# Patient Record
Sex: MALE | Race: WHITE | NOT HISPANIC OR LATINO | Employment: OTHER | URBAN - METROPOLITAN AREA
[De-identification: names, ages, dates, MRNs, and addresses within clinical notes are randomized per-mention and may not be internally consistent; named-entity substitution may affect disease eponyms.]

---

## 2023-08-10 ENCOUNTER — TELEPHONE (OUTPATIENT)
Dept: PRIMARY CARE | Facility: CLINIC | Age: 73
End: 2023-08-10
Payer: MEDICARE

## 2023-08-10 NOTE — TELEPHONE ENCOUNTER
Patient is having total shoulder replacement surgery  on the 24th and is to be released the 25th but he has a npv on the 25th and he wanted to reschedule. I told him the next available appointment is oct but hell be in [place] for the rest of the winter and was hoping to meet dr. Lawton before then to continue his meds. I informed him dr. Lawton only takes one npv a day but he wanted to ask the dr if he would make an exception because he's come highly recommended by his sister and granddaughter. Please advise

## 2023-08-18 PROBLEM — R05.9 COUGH: Status: ACTIVE | Noted: 2023-08-18

## 2023-08-18 RX ORDER — HYDROCHLOROTHIAZIDE 25 MG/1
TABLET ORAL
COMMUNITY
Start: 2023-08-04 | End: 2024-03-04 | Stop reason: SDUPTHER

## 2023-08-18 RX ORDER — FUROSEMIDE 20 MG/1
TABLET ORAL
COMMUNITY
Start: 2023-04-17 | End: 2023-08-22 | Stop reason: ALTCHOICE

## 2023-08-18 RX ORDER — NAPROXEN SODIUM 220 MG/1
1 TABLET, FILM COATED ORAL DAILY
COMMUNITY
End: 2023-08-22 | Stop reason: ALTCHOICE

## 2023-08-18 RX ORDER — AMOXICILLIN 500 MG/1
CAPSULE ORAL
COMMUNITY
Start: 2023-05-31 | End: 2023-08-22 | Stop reason: ALTCHOICE

## 2023-08-18 RX ORDER — TADALAFIL 2.5 MG/1
2.5 TABLET ORAL DAILY
COMMUNITY
Start: 2022-09-20 | End: 2023-08-22 | Stop reason: ALTCHOICE

## 2023-08-18 RX ORDER — LORATADINE 10 MG/1
1 TABLET ORAL DAILY
COMMUNITY
End: 2023-08-22 | Stop reason: ALTCHOICE

## 2023-08-18 RX ORDER — ALBUTEROL SULFATE 90 UG/1
AEROSOL, METERED RESPIRATORY (INHALATION)
COMMUNITY
Start: 2022-08-24

## 2023-08-18 RX ORDER — TAMSULOSIN HYDROCHLORIDE 0.4 MG/1
0.4 CAPSULE ORAL
COMMUNITY
Start: 2023-05-22 | End: 2023-08-30 | Stop reason: SDUPTHER

## 2023-08-18 RX ORDER — MULTIVITAMIN/IRON/FOLIC ACID 18MG-0.4MG
TABLET ORAL
COMMUNITY

## 2023-08-18 RX ORDER — LOSARTAN POTASSIUM AND HYDROCHLOROTHIAZIDE 25; 100 MG/1; MG/1
1 TABLET ORAL DAILY
COMMUNITY
End: 2024-05-21 | Stop reason: WASHOUT

## 2023-08-18 RX ORDER — AMLODIPINE BESYLATE 5 MG/1
TABLET ORAL
COMMUNITY
End: 2024-03-04 | Stop reason: SDUPTHER

## 2023-08-18 RX ORDER — LOSARTAN POTASSIUM AND HYDROCHLOROTHIAZIDE 12.5; 1 MG/1; MG/1
TABLET ORAL
COMMUNITY
End: 2023-08-22 | Stop reason: ALTCHOICE

## 2023-08-18 RX ORDER — CEPHALEXIN 500 MG/1
CAPSULE ORAL
COMMUNITY
Start: 2023-04-17 | End: 2023-08-22 | Stop reason: ALTCHOICE

## 2023-08-18 RX ORDER — CYCLOBENZAPRINE HCL 10 MG
10 TABLET ORAL EVERY 12 HOURS PRN
COMMUNITY
Start: 2023-06-22 | End: 2024-05-21 | Stop reason: SDUPTHER

## 2023-08-18 RX ORDER — AMLODIPINE BESYLATE 2.5 MG/1
1 TABLET ORAL DAILY
COMMUNITY
End: 2023-08-22 | Stop reason: DRUGHIGH

## 2023-08-18 RX ORDER — SYRING-NEEDL,DISP,INSUL,0.3 ML 29 G X1/2"
SYRINGE, EMPTY DISPOSABLE MISCELLANEOUS
COMMUNITY
Start: 2022-10-07 | End: 2023-08-22 | Stop reason: ALTCHOICE

## 2023-08-18 RX ORDER — MUPIROCIN 20 MG/G
OINTMENT TOPICAL
COMMUNITY
Start: 2023-08-10 | End: 2023-08-22 | Stop reason: ALTCHOICE

## 2023-08-18 RX ORDER — OXYCODONE HYDROCHLORIDE 5 MG/1
5 TABLET ORAL EVERY 8 HOURS PRN
COMMUNITY
Start: 2023-07-06 | End: 2024-05-21 | Stop reason: SDUPTHER

## 2023-08-18 RX ORDER — TRAMADOL HYDROCHLORIDE 50 MG/1
50 TABLET ORAL EVERY 6 HOURS PRN
COMMUNITY
Start: 2023-06-22 | End: 2023-08-29 | Stop reason: ALTCHOICE

## 2023-08-21 ENCOUNTER — OFFICE VISIT (OUTPATIENT)
Dept: PRIMARY CARE | Facility: CLINIC | Age: 73
End: 2023-08-21
Payer: MEDICARE

## 2023-08-21 DIAGNOSIS — I10 HYPERTENSION, ESSENTIAL: ICD-10-CM

## 2023-08-21 DIAGNOSIS — C79.51 PROSTATE CANCER METASTATIC TO BONE (MULTI): ICD-10-CM

## 2023-08-21 DIAGNOSIS — K21.9 GASTROESOPHAGEAL REFLUX DISEASE WITHOUT ESOPHAGITIS: ICD-10-CM

## 2023-08-21 DIAGNOSIS — I25.10 CORONARY ARTERY DISEASE INVOLVING NATIVE CORONARY ARTERY OF NATIVE HEART WITHOUT ANGINA PECTORIS: Primary | ICD-10-CM

## 2023-08-21 DIAGNOSIS — C61 PROSTATE CANCER METASTATIC TO BONE (MULTI): ICD-10-CM

## 2023-08-21 DIAGNOSIS — J43.1 PANLOBULAR EMPHYSEMA (MULTI): ICD-10-CM

## 2023-08-21 DIAGNOSIS — M19.012 PRIMARY OSTEOARTHRITIS OF LEFT SHOULDER: ICD-10-CM

## 2023-08-21 PROCEDURE — 99204 OFFICE O/P NEW MOD 45 MIN: CPT | Performed by: FAMILY MEDICINE

## 2023-08-21 PROCEDURE — 3079F DIAST BP 80-89 MM HG: CPT | Performed by: FAMILY MEDICINE

## 2023-08-21 PROCEDURE — 1036F TOBACCO NON-USER: CPT | Performed by: FAMILY MEDICINE

## 2023-08-21 PROCEDURE — 1160F RVW MEDS BY RX/DR IN RCRD: CPT | Performed by: FAMILY MEDICINE

## 2023-08-21 PROCEDURE — 3074F SYST BP LT 130 MM HG: CPT | Performed by: FAMILY MEDICINE

## 2023-08-21 PROCEDURE — 1159F MED LIST DOCD IN RCRD: CPT | Performed by: FAMILY MEDICINE

## 2023-08-22 VITALS
SYSTOLIC BLOOD PRESSURE: 124 MMHG | TEMPERATURE: 97.2 F | HEART RATE: 85 BPM | WEIGHT: 173 LBS | BODY MASS INDEX: 25.62 KG/M2 | DIASTOLIC BLOOD PRESSURE: 82 MMHG | OXYGEN SATURATION: 95 % | HEIGHT: 69 IN

## 2023-08-22 PROBLEM — C61 PROSTATE CANCER METASTATIC TO BONE (MULTI): Status: ACTIVE | Noted: 2022-05-23

## 2023-08-22 PROBLEM — N40.1 BENIGN PROSTATIC HYPERPLASIA WITH URINARY HESITANCY: Status: ACTIVE | Noted: 2022-03-25

## 2023-08-22 PROBLEM — F17.200 SMOKER: Status: ACTIVE | Noted: 2019-03-25

## 2023-08-22 PROBLEM — I25.10 CAD (CORONARY ARTERY DISEASE): Status: ACTIVE | Noted: 2023-08-10

## 2023-08-22 PROBLEM — R39.11 BENIGN PROSTATIC HYPERPLASIA WITH URINARY HESITANCY: Status: ACTIVE | Noted: 2022-03-25

## 2023-08-22 PROBLEM — C79.51 MALIGNANT NEOPLASM METASTATIC TO BONE (MULTI): Status: ACTIVE | Noted: 2022-03-25

## 2023-08-22 PROBLEM — C79.51 PROSTATE CANCER METASTATIC TO BONE (MULTI): Status: ACTIVE | Noted: 2022-05-23

## 2023-08-22 PROBLEM — R68.89 TOTAL SELF-CARE DEFICIT: Status: ACTIVE | Noted: 2023-08-22

## 2023-08-22 PROBLEM — R91.1 PULMONARY NODULE: Status: ACTIVE | Noted: 2023-08-10

## 2023-08-22 PROBLEM — S22.42XD MULTIPLE CLOSED FRACTURES OF RIBS OF LEFT SIDE WITH ROUTINE HEALING: Status: ACTIVE | Noted: 2019-06-24

## 2023-08-22 PROBLEM — M19.012 PRIMARY OSTEOARTHRITIS OF LEFT SHOULDER: Status: ACTIVE | Noted: 2023-08-07

## 2023-08-22 PROBLEM — J18.9 CAP (COMMUNITY ACQUIRED PNEUMONIA): Status: ACTIVE | Noted: 2022-03-25

## 2023-08-22 PROBLEM — R07.9 CHEST PAIN: Status: ACTIVE | Noted: 2022-03-24

## 2023-08-22 PROBLEM — E78.2 HYPERLIPIDEMIA, MIXED: Status: ACTIVE | Noted: 2022-05-23

## 2023-08-22 PROBLEM — I45.2 RBBB (RIGHT BUNDLE BRANCH BLOCK WITH LEFT ANTERIOR FASCICULAR BLOCK): Status: ACTIVE | Noted: 2022-05-23

## 2023-08-22 PROBLEM — M16.11 PRIMARY OSTEOARTHRITIS OF RIGHT HIP: Status: ACTIVE | Noted: 2019-03-25

## 2023-08-22 PROBLEM — K21.9 GASTROESOPHAGEAL REFLUX DISEASE: Status: ACTIVE | Noted: 2023-08-22

## 2023-08-22 PROBLEM — J44.9 COPD WITH HYPOXIA (MULTI): Status: ACTIVE | Noted: 2019-06-24

## 2023-08-22 RX ORDER — CALCIUM CARBONATE 300MG(750)
400 TABLET,CHEWABLE ORAL DAILY
COMMUNITY

## 2023-08-22 NOTE — PROGRESS NOTES
"Subjective   Patient ID: Johnnie Arceo is a 73 y.o. male who presents to [facility].      Currently getting stage four prostate cancer treatment in [place].     Discuss possible cognitive decline.     HPI  Patient with prostate cancer  Positive metastasis  Getting treatments    Memory issues  Would like to consider further work-up  We will start with MRI brain    GERD stable no red flag symptoms    Hypertension stable  No chest pain or shortness of breath    Coronary disease stable  No angina    Positive COPD  No cough for weeks    We will get shoulder replacement surgery at [facility] for bad shoulder left side  Review of Systems   Constitutional:  Negative for activity change and fatigue.   HENT:  Negative for congestion and sore throat.    Eyes:  Negative for discharge.   Respiratory:  Negative for cough, chest tightness and shortness of breath.    Cardiovascular:  Negative for chest pain and leg swelling.   Gastrointestinal:  Negative for abdominal pain, blood in stool, constipation, diarrhea, nausea and vomiting.   Endocrine: Negative for cold intolerance and heat intolerance.   Genitourinary:  Negative for difficulty urinating and hematuria.   Musculoskeletal:  Negative for arthralgias, back pain, gait problem, myalgias and neck pain.   Allergic/Immunologic: Negative for environmental allergies.   Neurological:  Negative for dizziness, syncope, weakness, numbness and headaches.   Hematological:  Negative for adenopathy. Does not bruise/bleed easily.   Psychiatric/Behavioral:  Negative for dysphoric mood. The patient is not nervous/anxious.    All other systems reviewed and are negative.      Objective   /82   Pulse 85   Temp 36.2 °C (97.2 °F)   Ht 1.753 m (5' 9\")   Wt 78.5 kg (173 lb)   SpO2 95%   BMI 25.55 kg/m²    Physical Exam  Vitals and nursing note reviewed.   Constitutional:       General: He is not in acute distress.     Appearance: Normal appearance.   HENT:      Head: " Normocephalic and atraumatic.      Right Ear: Tympanic membrane, ear canal and external ear normal.      Left Ear: Tympanic membrane, ear canal and external ear normal.      Nose: Nose normal.      Mouth/Throat:      Mouth: Mucous membranes are moist.      Pharynx: Oropharynx is clear. No oropharyngeal exudate or posterior oropharyngeal erythema.   Eyes:      Extraocular Movements: Extraocular movements intact.      Conjunctiva/sclera: Conjunctivae normal.      Pupils: Pupils are equal, round, and reactive to light.   Cardiovascular:      Rate and Rhythm: Normal rate and regular rhythm.      Pulses: Normal pulses.      Heart sounds: Normal heart sounds. No murmur heard.  Pulmonary:      Effort: Pulmonary effort is normal. No respiratory distress.      Breath sounds: Normal breath sounds. No wheezing or rales.   Abdominal:      General: Abdomen is flat. Bowel sounds are normal. There is no distension.      Palpations: Abdomen is soft. There is no mass.      Tenderness: There is no abdominal tenderness.   Musculoskeletal:         General: No swelling or deformity. Normal range of motion.      Cervical back: Normal range of motion and neck supple.      Right lower leg: No edema.      Left lower leg: No edema.   Lymphadenopathy:      Cervical: No cervical adenopathy.   Skin:     General: Skin is warm and dry.      Capillary Refill: Capillary refill takes less than 2 seconds.      Findings: No lesion or rash.   Neurological:      General: No focal deficit present.      Mental Status: He is alert and oriented to person, place, and time.      Cranial Nerves: No cranial nerve deficit.      Motor: No weakness.   Psychiatric:         Mood and Affect: Mood normal.         Behavior: Behavior normal.         Thought Content: Thought content normal.         Judgment: Judgment normal.         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  Stay current with age appropriate health maintenance as instructed.  Appointment here or ER with new or worsening symptoms'  Keep appropriate follow-up visit.  Stay current with proper immunizations   Discussed with patient and significant other  MRI brain  Stressed importance of follow-up visit with specialist  3-month recheck care

## 2023-08-25 ENCOUNTER — APPOINTMENT (OUTPATIENT)
Dept: PRIMARY CARE | Facility: CLINIC | Age: 73
End: 2023-08-25
Payer: MEDICARE

## 2023-08-28 ENCOUNTER — PATIENT OUTREACH (OUTPATIENT)
Dept: PRIMARY CARE | Facility: CLINIC | Age: 73
End: 2023-08-28
Payer: MEDICARE

## 2023-08-28 RX ORDER — POTASSIUM CHLORIDE 20 MEQ/1
40 TABLET, EXTENDED RELEASE ORAL
COMMUNITY
Start: 2023-08-27 | End: 2023-08-29

## 2023-08-28 NOTE — PROGRESS NOTES
Discharge Facility: [facility]  Discharge Diagnosis: COPD  Admission Date: 8/26/23  Discharge Date:  8/26/23    PCP Appointment Date: 8/29/23  Specialist Appointment Date:   - ortho: 9/6/23  Hospital Encounter and Summary: Linked   See discharge assessment below for further details    Medications  Medications reviewed with patient/caregiver?: Yes (New/changes only) (8/28/2023  1:48 PM)  Is the patient having any side effects they believe may be caused by any medication additions or changes?: No (8/28/2023  1:48 PM)  Does the patient have all medications ordered at discharge?: Yes (8/28/2023  1:48 PM)  Care Management Interventions: No intervention needed (8/28/2023  1:48 PM)  Is the patient taking all medications as directed (includes completed medication regime)?: Yes (8/28/2023  1:48 PM)  Care Management Interventions: Provided patient education (8/28/2023  1:48 PM)  Medication Comments: START: potassium 20mEq 2 tab every day x2 days (8/28/2023  1:48 PM)    Appointments  Does the patient have a primary care provider?: Yes (8/28/2023  1:48 PM)  Care Management Interventions: Verified appointment date/time/provider (8/28/2023  1:48 PM)  Care Management Interventions: Advised patient to keep appointment (8/28/2023  1:48 PM)    Self Management  Has home health visited the patient within 72 hours of discharge?: Not applicable (8/28/2023  1:48 PM)  What Durable Medical Equipment (DME) was ordered?: Oyxgen 2L on exertion (8/28/2023  1:48 PM)  Has all Durable Medical Equipment (DME) been delivered?: Yes (8/28/2023  1:48 PM)    Patient Teaching  Does the patient have access to their discharge instructions?: Yes (8/28/2023  1:48 PM)  Care Management Interventions: Reviewed instructions with patient (8/28/2023  1:48 PM)  What is the patient's perception of their health status since discharge?: Improving (8/28/2023  1:48 PM)  Is the patient/caregiver able to teach back the hierarchy of who to call/visit for  symptoms/problems? PCP, Specialist, Home Health nurse, Urgent Care, ED, 911: Yes (8/28/2023  1:48 PM)

## 2023-08-29 ENCOUNTER — OFFICE VISIT (OUTPATIENT)
Dept: PRIMARY CARE | Facility: CLINIC | Age: 73
End: 2023-08-29
Payer: MEDICARE

## 2023-08-29 VITALS
BODY MASS INDEX: 27.22 KG/M2 | WEIGHT: 183.8 LBS | OXYGEN SATURATION: 93 % | SYSTOLIC BLOOD PRESSURE: 110 MMHG | HEIGHT: 69 IN | TEMPERATURE: 97.7 F | HEART RATE: 80 BPM | DIASTOLIC BLOOD PRESSURE: 82 MMHG

## 2023-08-29 DIAGNOSIS — C79.51 PROSTATE CANCER METASTATIC TO BONE (MULTI): ICD-10-CM

## 2023-08-29 DIAGNOSIS — I25.10 CORONARY ARTERY DISEASE INVOLVING NATIVE CORONARY ARTERY OF NATIVE HEART WITHOUT ANGINA PECTORIS: Primary | ICD-10-CM

## 2023-08-29 DIAGNOSIS — E78.2 HYPERLIPIDEMIA, MIXED: ICD-10-CM

## 2023-08-29 DIAGNOSIS — C61 PROSTATE CANCER METASTATIC TO BONE (MULTI): ICD-10-CM

## 2023-08-29 DIAGNOSIS — K21.9 GASTROESOPHAGEAL REFLUX DISEASE WITHOUT ESOPHAGITIS: ICD-10-CM

## 2023-08-29 DIAGNOSIS — I10 HYPERTENSION, ESSENTIAL: ICD-10-CM

## 2023-08-29 DIAGNOSIS — Z72.0 TOBACCO ABUSE: ICD-10-CM

## 2023-08-29 DIAGNOSIS — J43.1 PANLOBULAR EMPHYSEMA (MULTI): ICD-10-CM

## 2023-08-29 DIAGNOSIS — G93.40 ENCEPHALOPATHY: ICD-10-CM

## 2023-08-29 DIAGNOSIS — M19.012 PRIMARY OSTEOARTHRITIS OF LEFT SHOULDER: ICD-10-CM

## 2023-08-29 PROBLEM — Z96.612 S/P REVERSE TOTAL SHOULDER ARTHROPLASTY, LEFT: Status: ACTIVE | Noted: 2023-08-24

## 2023-08-29 PROBLEM — E87.6 HYPOKALEMIA: Status: ACTIVE | Noted: 2023-08-26

## 2023-08-29 PROCEDURE — 1036F TOBACCO NON-USER: CPT | Performed by: FAMILY MEDICINE

## 2023-08-29 PROCEDURE — 3074F SYST BP LT 130 MM HG: CPT | Performed by: FAMILY MEDICINE

## 2023-08-29 PROCEDURE — 3079F DIAST BP 80-89 MM HG: CPT | Performed by: FAMILY MEDICINE

## 2023-08-29 PROCEDURE — 1160F RVW MEDS BY RX/DR IN RCRD: CPT | Performed by: FAMILY MEDICINE

## 2023-08-29 PROCEDURE — 99214 OFFICE O/P EST MOD 30 MIN: CPT | Performed by: FAMILY MEDICINE

## 2023-08-29 PROCEDURE — 1159F MED LIST DOCD IN RCRD: CPT | Performed by: FAMILY MEDICINE

## 2023-08-29 ASSESSMENT — ENCOUNTER SYMPTOMS
HEMATURIA: 0
CONSTIPATION: 0
DYSPHORIC MOOD: 0
NECK PAIN: 0
LOSS OF SENSATION IN FEET: 0
DEPRESSION: 0
WEAKNESS: 0
DIFFICULTY URINATING: 0
ABDOMINAL PAIN: 0
HEADACHES: 0
BRUISES/BLEEDS EASILY: 0
NERVOUS/ANXIOUS: 0
COUGH: 0
ADENOPATHY: 0
ARTHRALGIAS: 0
SHORTNESS OF BREATH: 0
FATIGUE: 0
OCCASIONAL FEELINGS OF UNSTEADINESS: 0
ACTIVITY CHANGE: 0
NAUSEA: 0
BACK PAIN: 0
DIARRHEA: 0
SORE THROAT: 0
EYE DISCHARGE: 0
CHEST TIGHTNESS: 0
DIZZINESS: 0
NUMBNESS: 0
MYALGIAS: 0
VOMITING: 0
BLOOD IN STOOL: 0

## 2023-08-29 ASSESSMENT — PATIENT HEALTH QUESTIONNAIRE - PHQ9
2. FEELING DOWN, DEPRESSED OR HOPELESS: NOT AT ALL
1. LITTLE INTEREST OR PLEASURE IN DOING THINGS: NOT AT ALL
SUM OF ALL RESPONSES TO PHQ9 QUESTIONS 1 AND 2: 0

## 2023-08-29 NOTE — PROGRESS NOTES
"Patient: Johnnie Arceo  : 1950  PCP: Salazar Lawton MD  MRN: [id number]  Program: No linked episodes     Johnnie Arceo is a 73 y.o. male presenting today for follow-up after being discharged from the hospital 3 days ago. The main problem requiring admission was COPD. The discharge summary and/or Transitional Care Management documentation was reviewed. Medication reconciliation was performed as indicated via the \"Brian as Reviewed\" timestamp.     Johnnie Arceo was contacted by Transitional Care Management services two days after his discharge. This encounter and supporting documentation was reviewed.    The complexity of medical decision making for this patient's transitional care is high.    Discharge Facility: [facility]  Discharge Diagnosis: COPD  Admission Date: 23  Discharge Date:  23    PCP Appointment Date: 23  Specialist Appointment Date:   - ortho: 23  Hospital Encounter and Summary: Linked   See discharge assessment below for further details    Medications  Medications reviewed with patient/caregiver?: Yes (New/changes only) (2023  1:48 PM)  Is the patient having any side effects they believe may be caused by any medication additions or changes?: No (2023  1:48 PM)  Does the patient have all medications ordered at discharge?: Yes (2023  1:48 PM)  Care Management Interventions: No intervention needed (2023  1:48 PM)  Is the patient taking all medications as directed (includes completed medication regime)?: Yes (2023  1:48 PM)  Care Management Interventions: Provided patient education (2023  1:48 PM)  Medication Comments: START: potassium 20mEq 2 tab every day x2 days (2023  1:48 PM)    Appointments  Does the patient have a primary care provider?: Yes (2023  1:48 PM)  Care Management Interventions: Verified appointment date/time/provider (2023  1:48 PM)  Care Management Interventions: Advised patient to keep appointment " (8/28/2023  1:48 PM)    Self Management  Has home health visited the patient within 72 hours of discharge?: Not applicable (8/28/2023  1:48 PM)  What Durable Medical Equipment (DME) was ordered?: Oyxgen 2L on exertion (8/28/2023  1:48 PM)  Has all Durable Medical Equipment (DME) been delivered?: Yes (8/28/2023  1:48 PM)    Patient Teaching  Does the patient have access to their discharge instructions?: Yes (8/28/2023  1:48 PM)  Care Management Interventions: Reviewed instructions with patient (8/28/2023  1:48 PM)  What is the patient's perception of their health status since discharge?: Improving (8/28/2023  1:48 PM)  Is the patient/caregiver able to teach back the hierarchy of who to call/visit for symptoms/problems? PCP, Specialist, Home Health nurse, Urgent Care, ED, 911: Yes (8/28/2023  1:48 PM)        Had shoulder surgery and then kept for breathing issues.     HPI  Status post left shoulder replacement  Healing well and will begin therapy    Prostate cancer with metastatic disease to the bone  Widespread  Undergoing treatment for this which seems to be stabilizing  Review recent PSA testing    Memory issues  Patient does not notice it so much but his significant other does  They would like further testing  We will check MRI brain  CAT scan brain 2021 was without metastatic brain disease    COPD ongoing  Patient is a smoker wheeze attempting to quit  Refer to pulmonology  He is using an inhaler  Review of Systems   Constitutional:  Negative for activity change and fatigue.   HENT:  Negative for congestion and sore throat.    Eyes:  Negative for discharge.   Respiratory:  Negative for cough, chest tightness and shortness of breath.    Cardiovascular:  Negative for chest pain and leg swelling.   Gastrointestinal:  Negative for abdominal pain, blood in stool, constipation, diarrhea, nausea and vomiting.   Endocrine: Negative for cold intolerance and heat intolerance.   Genitourinary:  Negative for difficulty  "urinating and hematuria.   Musculoskeletal:  Negative for arthralgias, back pain, gait problem, myalgias and neck pain.   Allergic/Immunologic: Negative for environmental allergies.   Neurological:  Negative for dizziness, syncope, weakness, numbness and headaches.   Hematological:  Negative for adenopathy. Does not bruise/bleed easily.   Psychiatric/Behavioral:  Negative for dysphoric mood. The patient is not nervous/anxious.    All other systems reviewed and are negative.      Objective   /82 (BP Location: Right arm, BP Cuff Size: Large adult)   Pulse 80   Temp 36.5 °C (97.7 °F)   Ht 1.753 m (5' 9\")   Wt 83.4 kg (183 lb 12.8 oz)   SpO2 93%   BMI 27.14 kg/m²    Physical Exam  Vitals and nursing note reviewed.   Constitutional:       General: He is not in acute distress.     Appearance: Normal appearance.   HENT:      Head: Normocephalic and atraumatic.      Right Ear: Tympanic membrane, ear canal and external ear normal.      Left Ear: Tympanic membrane, ear canal and external ear normal.      Nose: Nose normal.      Mouth/Throat:      Mouth: Mucous membranes are moist.      Pharynx: Oropharynx is clear. No oropharyngeal exudate or posterior oropharyngeal erythema.   Eyes:      Extraocular Movements: Extraocular movements intact.      Conjunctiva/sclera: Conjunctivae normal.      Pupils: Pupils are equal, round, and reactive to light.   Cardiovascular:      Rate and Rhythm: Normal rate and regular rhythm.      Pulses: Normal pulses.      Heart sounds: Normal heart sounds. No murmur heard.  Pulmonary:      Effort: Pulmonary effort is normal. No respiratory distress.      Breath sounds: Normal breath sounds. No wheezing or rales.   Abdominal:      General: Abdomen is flat. Bowel sounds are normal. There is no distension.      Palpations: Abdomen is soft. There is no mass.      Tenderness: There is no abdominal tenderness.   Musculoskeletal:         General: No swelling or deformity. Normal range of " motion.      Cervical back: Normal range of motion and neck supple.      Right lower leg: No edema.      Left lower leg: No edema.   Lymphadenopathy:      Cervical: No cervical adenopathy.   Skin:     General: Skin is warm and dry.      Capillary Refill: Capillary refill takes less than 2 seconds.      Findings: No lesion or rash.   Neurological:      General: No focal deficit present.      Mental Status: He is alert and oriented to person, place, and time.      Cranial Nerves: No cranial nerve deficit.      Motor: No weakness.   Psychiatric:         Mood and Affect: Mood normal.         Behavior: Behavior normal.         Thought Content: Thought content normal.         Judgment: Judgment normal.         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  Stay current with age appropriate health maintenance as instructed.  Appointment here or ER with new or worsening symptoms'  Keep appropriate follow-up visit.  Stay current with proper immunizations

## 2023-08-30 DIAGNOSIS — R39.11 BENIGN PROSTATIC HYPERPLASIA WITH URINARY HESITANCY: ICD-10-CM

## 2023-08-30 DIAGNOSIS — N40.1 BENIGN PROSTATIC HYPERPLASIA WITH URINARY HESITANCY: ICD-10-CM

## 2023-08-30 RX ORDER — TAMSULOSIN HYDROCHLORIDE 0.4 MG/1
0.4 CAPSULE ORAL DAILY
Qty: 30 CAPSULE | Refills: 0 | Status: SHIPPED | OUTPATIENT
Start: 2023-08-30

## 2023-08-30 NOTE — TELEPHONE ENCOUNTER
Rx Refill Request Telephone Encounter    Name:  Johnnie Arceo  :  [id number]  Medication Name:  tamsulosin 0.4mg    [facility] [address] [facility]    Date of last appointment:  23  Date of next appointment:  n/a   Best number to reach patient:  [phone]

## 2023-08-30 NOTE — TELEPHONE ENCOUNTER
Please advise pended medication for Dr. Lawton patient.      Medication has been pended to provider for approval.     LV: 8/29/2023   NV:  Visit date not found

## 2023-09-01 ENCOUNTER — PATIENT OUTREACH (OUTPATIENT)
Dept: PRIMARY CARE | Facility: CLINIC | Age: 73
End: 2023-09-01
Payer: MEDICARE

## 2023-09-01 NOTE — PROGRESS NOTES
Unable to reach patient for call back after patient's follow up appointment with PCP.   BENJAMINM with call back number for patient to call if needed   If no voicemail available call attempts x 2 were made to contact the patient to assist with any questions or concerns patient may have.

## 2023-09-05 PROBLEM — G93.40 ENCEPHALOPATHY: Status: ACTIVE | Noted: 2023-09-05

## 2023-09-05 ASSESSMENT — ENCOUNTER SYMPTOMS
FATIGUE: 0
NAUSEA: 0
CHEST TIGHTNESS: 0
HEMATURIA: 0
BRUISES/BLEEDS EASILY: 0
WEAKNESS: 0
DIFFICULTY URINATING: 0
EYE DISCHARGE: 0
VOMITING: 0
ABDOMINAL PAIN: 0
HEADACHES: 0
BLOOD IN STOOL: 0
DYSPHORIC MOOD: 0
SHORTNESS OF BREATH: 0
NERVOUS/ANXIOUS: 0
ARTHRALGIAS: 0
CONSTIPATION: 0
ACTIVITY CHANGE: 0
SORE THROAT: 0
MYALGIAS: 0
BACK PAIN: 0
DIZZINESS: 0
COUGH: 0
ADENOPATHY: 0
NECK PAIN: 0
NUMBNESS: 0
DIARRHEA: 0

## 2023-09-14 DIAGNOSIS — R90.89 ABNORMAL BRAIN MRI: ICD-10-CM

## 2023-10-05 ENCOUNTER — OFFICE VISIT (OUTPATIENT)
Dept: NEUROSURGERY | Facility: HOSPITAL | Age: 73
End: 2023-10-05
Payer: MEDICARE

## 2023-10-05 VITALS
DIASTOLIC BLOOD PRESSURE: 70 MMHG | SYSTOLIC BLOOD PRESSURE: 129 MMHG | HEIGHT: 70 IN | BODY MASS INDEX: 25.77 KG/M2 | HEART RATE: 71 BPM | WEIGHT: 180 LBS | RESPIRATION RATE: 18 BRPM

## 2023-10-05 DIAGNOSIS — I65.21 OCCLUSION OF RIGHT CAROTID ARTERY: Primary | ICD-10-CM

## 2023-10-05 PROCEDURE — 1036F TOBACCO NON-USER: CPT | Performed by: NEUROLOGICAL SURGERY

## 2023-10-05 PROCEDURE — 3078F DIAST BP <80 MM HG: CPT | Performed by: NEUROLOGICAL SURGERY

## 2023-10-05 PROCEDURE — 3074F SYST BP LT 130 MM HG: CPT | Performed by: NEUROLOGICAL SURGERY

## 2023-10-05 PROCEDURE — 1125F AMNT PAIN NOTED PAIN PRSNT: CPT | Performed by: NEUROLOGICAL SURGERY

## 2023-10-05 PROCEDURE — 99443 PR PHYS/QHP TELEPHONE EVALUATION 21-30 MIN: CPT | Performed by: NEUROLOGICAL SURGERY

## 2023-10-05 PROCEDURE — 1159F MED LIST DOCD IN RCRD: CPT | Performed by: NEUROLOGICAL SURGERY

## 2023-10-05 PROCEDURE — 1160F RVW MEDS BY RX/DR IN RCRD: CPT | Performed by: NEUROLOGICAL SURGERY

## 2023-10-05 ASSESSMENT — PAIN SCALES - GENERAL: PAINLEVEL: 8

## 2023-10-05 NOTE — PROGRESS NOTES
NPV- MRI noted Loss of Flow Void within the Cavernous and Paraopthalmic Segments of the Right ICA. MRI done 9/13/23. Presented with memory difficulty 5 years ago. PCP ordered MRI for memory difficulty. Short term memory issues.    Johnnie Arceo is a 73 y.o. year old male    HPI  This was a telephone visit.     Mr. Arceo is a 74 yo gentleman, per patient, had a MRI done on 9/13/2023 at the request of his significant other because he was getting older.  He denied any symptoms that prompted this MRI.  Per radiology requisition, the MRI was ordered for memory loss.      He is currently on an 81 mg ASA daily.      Review of Systems       Past Medical History:   Diagnosis Date    Infective polyarthritis (CMS/HCC) 09/27/2011       Past Surgical History:   Procedure Laterality Date    CT GUIDED PERCUTANEOUS BIOPSY BONE DEEP  4/5/2022    CT GUIDED PERCUTANEOUS BIOPSY BONE DEEP 4/5/2022           Current Outpatient Medications:     albuterol 90 mcg/actuation inhaler, INHALE 2 PUFFS AS DIRECTED EVERY 4 HOURS AS NEEDED, Disp: , Rfl:     amLODIPine (Norvasc) 5 mg tablet, Take by mouth., Disp: , Rfl:     apalutamide (Erleada) 60 mg tablet, Take by mouth., Disp: , Rfl:     cyclobenzaprine (Flexeril) 10 mg tablet, Take 1 tablet (10 mg) by mouth every 12 hours if needed., Disp: , Rfl:     hydroCHLOROthiazide (HYDRODiuril) 25 mg tablet, , Disp: , Rfl:     losartan-hydrochlorothiazide (Hyzaar) 100-25 mg tablet, Take 1 tablet by mouth once daily., Disp: , Rfl:     magnesium oxide (Mag-Ox) 400 mg tablet, 1 tablet (400 mg) once daily., Disp: , Rfl:     oxyCODONE (Roxicodone) 5 mg immediate release tablet, Take 1 tablet (5 mg) by mouth every 8 hours if needed., Disp: , Rfl:     tamsulosin (Flomax) 0.4 mg 24 hr capsule, Take 1 capsule (0.4 mg) by mouth once daily., Disp: 30 capsule, Rfl: 0    TURMERIC ORAL, Take by mouth., Disp: , Rfl:     multivitamin-iron-folic acid (Spectravite Advanced Formula)  mg-mcg tablet tablet, Take by  mouth., Disp: , Rfl:         Objective   Vitals:    10/05/23 1148   BP: 129/70   Pulse: 71   Resp: 18       Neurological Exam  Deferred    [unfilled]  MR brain w and wo IV contrast  Status: Final result     PACS Images     Show images for MR brain w and wo IV contrast  Signed by    Signed Time Phone Pager   Jaye Nielsen MD 9/13/2023 15:40 [phone]      Exam Information    Status Exam Begun Exam Ended   Final  9/13/2023 15:16     Study Result    Narrative & Impression   Interpreted By:  JAYE NIELSEN MD  MRN: [id number]  Patient Name: ZENOBIA YEAGER     STUDY:  MRI BRAIN W/WO CONTRAST     INDICATION:  encephalopthy     COMPARISON:  None.     ACCESSION NUMBER(S):  [id number]     ORDERING CLINICIAN:  NOMI CATHERINE     TECHNIQUE:  Multi-planar multi-sequential MR imaging of the brain was performed  before and after the intravenous administration of contrast. 16 ml of  Dotarem was administered (the balance of single use vial(s) has/have  been discarded).     FINDINGS:     Faint punctate infarcts within the right centrum semiovale in the  expected distribution of the right internal watershed territory. No  associated hemorrhage or mass effect.     No focal mass. No abnormal intracranial enhancement is identified.     No hydrocephalus.  No extra-axial fluid collections.     Loss of flow void within the cavernous and paraophthalmic segments of  the right ICA.     DVA in the left temporal lobe noted.     The visualized intraorbital contents are normal. The imaged portions  of the paranasal sinuses are clear. Small right mastoid effusion. The  visualized osseous structures, soft tissues and partially visualized  parotid glands appear normal.     IMPRESSION:  Punctate multifocal infarcts in the right internal watershed  territory without associated hemorrhage or mass effect.     Loss of flow void within the cavernous and paraophthalmic segments of  the right ICA. Dedicated vascular imaging recommended for  further  evaluation.     A notify message was sent.     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  Per patient, he is currently asymptomatic on the phone and he wasn't aware of the MRI result previously.      No acute neurosurgical intervention at this point.  Recommend follow-up with a stroke neurologist.  Continue ASA as recommended.  All questions were answered to his satisfaction.

## 2023-10-12 ENCOUNTER — TELEPHONE (OUTPATIENT)
Dept: PRIMARY CARE | Facility: CLINIC | Age: 73
End: 2023-10-12
Payer: MEDICARE

## 2023-10-12 NOTE — TELEPHONE ENCOUNTER
ROZ:  The patient phoned to retrieve aid in obtaining the results to his PFT with Dr. Núñez.   Because of an appointment he had with a Dr. Flores in [place] that ran two hours later than expected on the same day of his appointment with Dr. Núñez, it made him miss the follow up for his PFT results with Wilton.  The patient requested I contact Wilton's office for information. Specifically to seek if he may be seen virtually.  I was informed that their office do not conduct virtual appointments. Seeing as the patient is in [place], he would not have been honored.     I spoke with Dr. Núñez's office. They are going to fax the PFT results to Dr. Lawton at [phone].   Please have Dr. Lawton discuss results with patient.  THE PATIENT IS AWARE.

## 2023-10-16 ENCOUNTER — TELEPHONE (OUTPATIENT)
Dept: PRIMARY CARE | Facility: CLINIC | Age: 73
End: 2023-10-16
Payer: MEDICARE

## 2023-10-16 DIAGNOSIS — Z00.00 ROUTINE ADULT HEALTH MAINTENANCE: Primary | ICD-10-CM

## 2023-10-16 DIAGNOSIS — G93.40 ENCEPHALOPATHY: ICD-10-CM

## 2023-10-16 DIAGNOSIS — R68.89 TOTAL SELF-CARE DEFICIT: ICD-10-CM

## 2023-10-16 NOTE — TELEPHONE ENCOUNTER
Patient is down in [place] for the winter and his oncologist suggested he get a referral to stroke specialist. Also his [place] neurologist needs a referral from [facility] (dr. genao) faxed over [phone] to treat him.

## 2023-10-18 ENCOUNTER — PATIENT OUTREACH (OUTPATIENT)
Dept: PRIMARY CARE | Facility: CLINIC | Age: 73
End: 2023-10-18
Payer: MEDICARE

## 2023-10-18 NOTE — PROGRESS NOTES
Call regarding 2 month post discharge assessment.  At time of outreach call the patient feels as if their condition has improved since last visit. States he has only had to use the portable oxygen tank once and is down in [place] for the winter. Denies additional concerns at this time.   Reviewed the PCP appointment with the pt and addressed any questions or concerns.

## 2023-11-13 ENCOUNTER — TELEPHONE (OUTPATIENT)
Dept: PRIMARY CARE | Facility: CLINIC | Age: 73
End: 2023-11-13
Payer: MEDICARE

## 2023-11-13 NOTE — TELEPHONE ENCOUNTER
The patient will be flying out on 11/19/2023, this upcoming Sunday.    He is requesting a letter informing the airlines/ TSA of his need of oxygen usage while flying on an airline.  This will allow him to be cleared through security/ TSA.  May someone contact the patient once the letter is ready for ?  He may be contacted via his cellular number at [phone].

## 2023-11-15 ENCOUNTER — PATIENT OUTREACH (OUTPATIENT)
Dept: PRIMARY CARE | Facility: CLINIC | Age: 73
End: 2023-11-15
Payer: MEDICARE

## 2023-11-15 NOTE — PROGRESS NOTES
Unsuccessful outreach to this patient for the 90 day post discharge outreach. Left a voice-mail message including my direct call back number for the patient to call regarding any questions or concerns.

## 2024-03-04 DIAGNOSIS — E87.6 HYPOKALEMIA: ICD-10-CM

## 2024-03-04 DIAGNOSIS — I10 HYPERTENSION, ESSENTIAL: Primary | ICD-10-CM

## 2024-03-04 RX ORDER — POTASSIUM CHLORIDE 20 MEQ/1
20 TABLET, EXTENDED RELEASE ORAL DAILY
COMMUNITY
End: 2024-03-04 | Stop reason: SDUPTHER

## 2024-03-04 RX ORDER — HYDROCHLOROTHIAZIDE 25 MG/1
25 TABLET ORAL DAILY
Qty: 90 TABLET | Refills: 1 | Status: SHIPPED | OUTPATIENT
Start: 2024-03-04

## 2024-03-04 RX ORDER — POTASSIUM CHLORIDE 20 MEQ/1
20 TABLET, EXTENDED RELEASE ORAL DAILY
Qty: 90 TABLET | Refills: 1 | Status: SHIPPED | OUTPATIENT
Start: 2024-03-04 | End: 2024-05-21 | Stop reason: WASHOUT

## 2024-03-04 RX ORDER — AMLODIPINE BESYLATE 5 MG/1
5 TABLET ORAL DAILY
Qty: 90 TABLET | Refills: 1 | Status: SHIPPED | OUTPATIENT
Start: 2024-03-04

## 2024-03-04 NOTE — TELEPHONE ENCOUNTER
Rx Refill Request Telephone Encounter    Name:  Johnnie Arceo  :  [id number]  Medication Name:  amLODIPine (Norvasc) 5 mg tablet   potassium chloride CR (Klor-Con M20) 20 mEq ER tablet   hydroCHLOROthiazide (HYDRODiuril) 25 mg tablet     Specific Pharmacy location:  [facility]  Date of last appointment:  23  Date of next appointment:  24  Best number to reach patient:  [phone]

## 2024-03-15 DIAGNOSIS — I10 HYPERTENSION, ESSENTIAL: ICD-10-CM

## 2024-03-15 RX ORDER — LOSARTAN POTASSIUM 100 MG/1
100 TABLET ORAL DAILY
Qty: 90 TABLET | Refills: 1 | Status: SHIPPED | OUTPATIENT
Start: 2024-03-15

## 2024-03-15 NOTE — TELEPHONE ENCOUNTER
Patient called in stating he received his refills from [facility] today and received a bottle of klor-con M20. Patient stated he does not remember ever taking this prescription but it is dated in his chart back on 8/27/2023. Patient wondering if he should be taking this? He would like a call back ASAP  Please Advise

## 2024-03-15 NOTE — TELEPHONE ENCOUNTER
Patient is aware--- confusion was with the losartan potassium -- he does not take the losartan potassium just needs the losartan, he has a whole bottle of hydrochlorothiazide as well      Please review pended medication for approval

## 2024-05-20 NOTE — PROGRESS NOTES
Subjective   Reason for Visit: Johnnie Arceo is an 74 y.o. male here for a Medicare Wellness visit.     Past Medical, Surgical, and Family History reviewed and updated in chart.    Reviewed all medications by prescribing practitioner or clinical pharmacist (such as prescriptions, OTCs, herbal therapies and supplements) and documented in the medical record.    HPI    Patient here for Medicare wellness    Also general checkup    Prostate cancer with mets to the bone  Chemotherapy is working really well for him at this time but he has chronic pain  Review his pain treatments  He gets regular treatments in [place] and here  He would like to have pain management here  Discussed risks and benefits I am okay with doing this  He is current with urine drug screening he does need a CSA    Hypertension stable no chest pain or shortness of breath    High cholesterol stable watch diet follow blood work    Patient here with significant other    Swelling of the feet bilaterally  Has a lot of salt in his diet  Low-sodium diet  Discussed further workup or testing he declines    Numb feet present for some time  Discussed further workup he declines    R elbow discomfort  Sounds like tendinitis  No trauma or injury  He declines testing    Feet numb feet  Since prior    Patient is a smoker and must quit    Skin lesion of the trunk x 2  Possible Seb ker  He would like Derm evaluation and possible biopsy  Patient Care Team:  Salazar Lawton MD as PCP - General (Family Medicine)  Salazar Lawton MD as PCP - [employer] PCP     Review of Systems   Constitutional:  Negative for activity change and fatigue.   HENT:  Negative for congestion and sore throat.    Eyes:  Negative for discharge.   Respiratory:  Negative for cough, chest tightness and shortness of breath.    Cardiovascular:  Negative for chest pain and leg swelling.   Gastrointestinal:  Negative for abdominal pain, blood in stool, constipation, diarrhea,  "nausea and vomiting.   Endocrine: Negative for cold intolerance and heat intolerance.   Genitourinary:  Negative for difficulty urinating and hematuria.   Musculoskeletal:  Negative for arthralgias, back pain, gait problem, myalgias and neck pain.   Allergic/Immunologic: Negative for environmental allergies.   Neurological:  Negative for dizziness, syncope, weakness, numbness and headaches.   Hematological:  Negative for adenopathy. Does not bruise/bleed easily.   Psychiatric/Behavioral:  Negative for dysphoric mood. The patient is not nervous/anxious.    All other systems reviewed and are negative.      Objective   Vitals:  /74 (BP Location: Right arm, BP Cuff Size: Large adult)   Ht 1.778 m (5' 10\")   Wt 93.5 kg (206 lb 3.2 oz)   SpO2 97%   BMI 29.59 kg/m²       Physical Exam  Vitals and nursing note reviewed.   Constitutional:       General: He is not in acute distress.     Appearance: Normal appearance.   HENT:      Head: Normocephalic and atraumatic.      Right Ear: Tympanic membrane, ear canal and external ear normal.      Left Ear: Tympanic membrane, ear canal and external ear normal.      Nose: Nose normal.      Mouth/Throat:      Mouth: Mucous membranes are moist.      Pharynx: Oropharynx is clear. No oropharyngeal exudate or posterior oropharyngeal erythema.   Eyes:      Extraocular Movements: Extraocular movements intact.      Conjunctiva/sclera: Conjunctivae normal.      Pupils: Pupils are equal, round, and reactive to light.   Cardiovascular:      Rate and Rhythm: Normal rate and regular rhythm.      Pulses: Normal pulses.      Heart sounds: Normal heart sounds. No murmur heard.  Pulmonary:      Effort: Pulmonary effort is normal. No respiratory distress.      Breath sounds: Normal breath sounds. No wheezing or rales.   Abdominal:      General: Abdomen is flat. Bowel sounds are normal. There is no distension.      Palpations: Abdomen is soft. There is no mass.      Tenderness: There is no " abdominal tenderness.   Musculoskeletal:         General: No swelling or deformity. Normal range of motion.      Cervical back: Normal range of motion and neck supple.      Right lower leg: No edema.      Left lower leg: No edema.   Lymphadenopathy:      Cervical: No cervical adenopathy.   Skin:     General: Skin is warm and dry.      Capillary Refill: Capillary refill takes less than 2 seconds.      Findings: No lesion or rash.   Neurological:      General: No focal deficit present.      Mental Status: He is alert and oriented to person, place, and time.      Cranial Nerves: No cranial nerve deficit.      Motor: No weakness.   Psychiatric:         Mood and Affect: Mood normal.         Behavior: Behavior normal.         Thought Content: Thought content normal.         Judgment: Judgment normal.     Skin lesion of the trunk possible irritated seborrheic keratosis or other lesion    Assessment/Plan   Problem List Items Addressed This Visit       Prostate cancer metastatic to bone (Multi)    Overview     Last Assessment & Plan: Formatting of this note might be different from the original. Assessment: Following with hematology.         Smoker    Overview     Last Assessment & Plan: Formatting of this note might be different from the original. Assessment: smoking 1 PPD x 50 years working on decreasing         Panlobular emphysema (Multi)    Acute respiratory failure with hypoxia (Multi)     Other Visit Diagnoses       Routine general medical examination at health care facility    -  Primary    Skin lesion                 Patient education provided.  Stay current with age appropriate health maintenance as instructed.  Appointment here or ER with new or worsening symptoms'  Keep appropriate follow-up visit.  Stay current with proper immunizations  3 months  Stay current with urine drug screen and CSA  Discussed with   Keep specialist follow-up  Recommend remain current with colonoscopy he will consider

## 2024-05-21 ENCOUNTER — OFFICE VISIT (OUTPATIENT)
Dept: PRIMARY CARE | Facility: CLINIC | Age: 74
End: 2024-05-21
Payer: MEDICARE

## 2024-05-21 VITALS
OXYGEN SATURATION: 97 % | DIASTOLIC BLOOD PRESSURE: 74 MMHG | BODY MASS INDEX: 29.52 KG/M2 | WEIGHT: 206.2 LBS | SYSTOLIC BLOOD PRESSURE: 131 MMHG | HEIGHT: 70 IN

## 2024-05-21 DIAGNOSIS — Z00.00 ROUTINE GENERAL MEDICAL EXAMINATION AT HEALTH CARE FACILITY: Primary | ICD-10-CM

## 2024-05-21 DIAGNOSIS — C79.51 PROSTATE CANCER METASTATIC TO BONE (MULTI): ICD-10-CM

## 2024-05-21 DIAGNOSIS — L98.9 SKIN LESION: ICD-10-CM

## 2024-05-21 DIAGNOSIS — J96.01 ACUTE RESPIRATORY FAILURE WITH HYPOXIA (MULTI): ICD-10-CM

## 2024-05-21 DIAGNOSIS — J43.1 PANLOBULAR EMPHYSEMA (MULTI): ICD-10-CM

## 2024-05-21 DIAGNOSIS — F17.200 SMOKER: ICD-10-CM

## 2024-05-21 DIAGNOSIS — C61 PROSTATE CANCER METASTATIC TO BONE (MULTI): ICD-10-CM

## 2024-05-21 PROCEDURE — 1160F RVW MEDS BY RX/DR IN RCRD: CPT | Performed by: FAMILY MEDICINE

## 2024-05-21 PROCEDURE — 1158F ADVNC CARE PLAN TLK DOCD: CPT | Performed by: FAMILY MEDICINE

## 2024-05-21 PROCEDURE — 3075F SYST BP GE 130 - 139MM HG: CPT | Performed by: FAMILY MEDICINE

## 2024-05-21 PROCEDURE — 99214 OFFICE O/P EST MOD 30 MIN: CPT | Performed by: FAMILY MEDICINE

## 2024-05-21 PROCEDURE — 1159F MED LIST DOCD IN RCRD: CPT | Performed by: FAMILY MEDICINE

## 2024-05-21 PROCEDURE — 3078F DIAST BP <80 MM HG: CPT | Performed by: FAMILY MEDICINE

## 2024-05-21 PROCEDURE — 1123F ACP DISCUSS/DSCN MKR DOCD: CPT | Performed by: FAMILY MEDICINE

## 2024-05-21 PROCEDURE — 1036F TOBACCO NON-USER: CPT | Performed by: FAMILY MEDICINE

## 2024-05-21 PROCEDURE — 1170F FXNL STATUS ASSESSED: CPT | Performed by: FAMILY MEDICINE

## 2024-05-21 RX ORDER — CYCLOBENZAPRINE HCL 10 MG
10 TABLET ORAL EVERY 12 HOURS PRN
Qty: 120 TABLET | Refills: 0 | Status: SHIPPED | OUTPATIENT
Start: 2024-05-21

## 2024-05-21 RX ORDER — TRAMADOL HYDROCHLORIDE 100 MG/1
100 TABLET, EXTENDED RELEASE ORAL DAILY
COMMUNITY
End: 2024-05-21 | Stop reason: SDUPTHER

## 2024-05-21 RX ORDER — OXYCODONE HYDROCHLORIDE 5 MG/1
5 TABLET ORAL EVERY 8 HOURS PRN
Qty: 60 TABLET | Refills: 0 | Status: SHIPPED | OUTPATIENT
Start: 2024-05-21 | End: 2024-06-20

## 2024-05-21 RX ORDER — TRAMADOL HYDROCHLORIDE 100 MG/1
100 TABLET, EXTENDED RELEASE ORAL DAILY
Qty: 90 TABLET | Refills: 0 | Status: SHIPPED | OUTPATIENT
Start: 2024-05-21 | End: 2024-08-19

## 2024-05-21 RX ORDER — ATORVASTATIN CALCIUM 20 MG/1
20 TABLET, FILM COATED ORAL
COMMUNITY
Start: 2023-11-22

## 2024-05-21 RX ORDER — NAPROXEN SODIUM 220 MG/1
81 TABLET, FILM COATED ORAL
COMMUNITY
Start: 2023-11-22

## 2024-05-21 ASSESSMENT — ENCOUNTER SYMPTOMS
MYALGIAS: 0
VOMITING: 0
NUMBNESS: 0
WEAKNESS: 0
BRUISES/BLEEDS EASILY: 0
SORE THROAT: 0
HEADACHES: 0
LOSS OF SENSATION IN FEET: 0
HEMATURIA: 0
CONSTIPATION: 0
ADENOPATHY: 0
DEPRESSION: 0
NERVOUS/ANXIOUS: 0
SHORTNESS OF BREATH: 0
DIARRHEA: 0
BACK PAIN: 0
COUGH: 0
OCCASIONAL FEELINGS OF UNSTEADINESS: 0
NAUSEA: 0
DIZZINESS: 0
NECK PAIN: 0
ABDOMINAL PAIN: 0
CHEST TIGHTNESS: 0
ARTHRALGIAS: 0
BLOOD IN STOOL: 0
DIFFICULTY URINATING: 0
DYSPHORIC MOOD: 0
ACTIVITY CHANGE: 0
EYE DISCHARGE: 0
FATIGUE: 0

## 2024-05-21 ASSESSMENT — PATIENT HEALTH QUESTIONNAIRE - PHQ9
SUM OF ALL RESPONSES TO PHQ9 QUESTIONS 1 AND 2: 0
1. LITTLE INTEREST OR PLEASURE IN DOING THINGS: NOT AT ALL
2. FEELING DOWN, DEPRESSED OR HOPELESS: NOT AT ALL

## 2024-05-21 ASSESSMENT — ACTIVITIES OF DAILY LIVING (ADL)
BATHING: INDEPENDENT
DRESSING: INDEPENDENT
MANAGING_FINANCES: INDEPENDENT
DOING_HOUSEWORK: INDEPENDENT
TAKING_MEDICATION: INDEPENDENT
GROCERY_SHOPPING: INDEPENDENT

## 2024-07-31 ENCOUNTER — APPOINTMENT (OUTPATIENT)
Dept: DERMATOLOGY | Facility: CLINIC | Age: 74
End: 2024-07-31
Payer: MEDICARE

## 2024-08-11 DIAGNOSIS — I10 HYPERTENSION, ESSENTIAL: ICD-10-CM

## 2024-08-12 DIAGNOSIS — C61 PROSTATE CANCER METASTATIC TO BONE (MULTI): ICD-10-CM

## 2024-08-12 DIAGNOSIS — C79.51 PROSTATE CANCER METASTATIC TO BONE (MULTI): ICD-10-CM

## 2024-08-12 RX ORDER — TRAMADOL HYDROCHLORIDE 100 MG/1
100 TABLET, EXTENDED RELEASE ORAL DAILY
Qty: 90 TABLET | Refills: 0 | Status: SHIPPED | OUTPATIENT
Start: 2024-08-12 | End: 2024-11-10

## 2024-08-12 RX ORDER — LOSARTAN POTASSIUM 100 MG/1
100 TABLET ORAL DAILY
Qty: 90 TABLET | Refills: 1 | Status: SHIPPED | OUTPATIENT
Start: 2024-08-12

## 2024-08-12 RX ORDER — HYDROCHLOROTHIAZIDE 25 MG/1
25 TABLET ORAL DAILY
Qty: 90 TABLET | Refills: 1 | Status: SHIPPED | OUTPATIENT
Start: 2024-08-12

## 2024-08-12 RX ORDER — AMLODIPINE BESYLATE 5 MG/1
5 TABLET ORAL DAILY
Qty: 90 TABLET | Refills: 1 | Status: SHIPPED | OUTPATIENT
Start: 2024-08-12

## 2024-08-12 NOTE — TELEPHONE ENCOUNTER
Rx Refill Request     Name: Johnnie Arceo  :  1950   Medication Name:  losartan (Cozaar) 100 mg tablet      hydroCHLOROthiazide (HYDRODiuril) 25 mg tablet      amLODIPine (Norvasc) 5 mg tablet     Specific Pharmacy location:  [facility]   Date of last appointment:  2024   Date of next appointment:  2024   Best number to reach patient:  [phone]

## 2024-08-12 NOTE — TELEPHONE ENCOUNTER
CSA/Uds  Opioids   Name:  Johnnie Arceo  :  [id number]  Medication Name:  tramadol ER (Ultram-ER) 100 mg   Specific Pharmacy location:  [address]  Date of last appointment:    Date of next appointment:    Best number to reach patient:  [phone]

## 2024-08-19 NOTE — PROGRESS NOTES
"Subjective   Patient ID: Johnnie Arceo is a 74 y.o. male who presents for Coronary Artery Disease and Follow-up.  HPI    Would like to know the di    Coronary disease stable no angina    GERD stable no red flag symptoms ference between the albuterol and the trelegy   COPD stable    Foot swelling sometimes     Has put on weight on since quitting smoking    Undergoing treatment for metastatic prostate cancer    Hypertension stable no chest pain or shortness of breath    High cholesterol stable watch diet follow blood work      Review of Systems   Constitutional:  Negative for activity change and fatigue.   HENT:  Negative for congestion and sore throat.    Eyes:  Negative for discharge.   Respiratory:  Negative for cough, chest tightness and shortness of breath.    Cardiovascular:  Negative for chest pain and leg swelling.   Gastrointestinal:  Negative for abdominal pain, blood in stool, constipation, diarrhea, nausea and vomiting.   Endocrine: Negative for cold intolerance and heat intolerance.   Genitourinary:  Negative for difficulty urinating and hematuria.   Musculoskeletal:  Negative for arthralgias, back pain, gait problem, myalgias and neck pain.   Allergic/Immunologic: Negative for environmental allergies.   Neurological:  Negative for dizziness, syncope, weakness, numbness and headaches.   Hematological:  Negative for adenopathy. Does not bruise/bleed easily.   Psychiatric/Behavioral:  Negative for dysphoric mood. The patient is not nervous/anxious.    All other systems reviewed and are negative.      Objective   /70 (BP Location: Left arm, BP Cuff Size: Large adult)   Pulse 73   Ht 1.778 m (5' 10\")   Wt 95.2 kg (209 lb 12.8 oz)   SpO2 95%   BMI 30.10 kg/m²    Physical Exam  Vitals and nursing note reviewed.   Constitutional:       General: He is not in acute distress.     Appearance: Normal appearance. He is obese.   HENT:      Head: Normocephalic and atraumatic.      Right Ear: Tympanic " membrane, ear canal and external ear normal.      Left Ear: Tympanic membrane, ear canal and external ear normal.      Nose: Nose normal.      Mouth/Throat:      Mouth: Mucous membranes are moist.      Pharynx: Oropharynx is clear. No oropharyngeal exudate or posterior oropharyngeal erythema.   Eyes:      Extraocular Movements: Extraocular movements intact.      Conjunctiva/sclera: Conjunctivae normal.      Pupils: Pupils are equal, round, and reactive to light.   Cardiovascular:      Rate and Rhythm: Normal rate and regular rhythm.      Pulses: Normal pulses.      Heart sounds: Normal heart sounds. No murmur heard.  Pulmonary:      Effort: Pulmonary effort is normal. No respiratory distress.      Breath sounds: Normal breath sounds. No wheezing or rales.   Abdominal:      General: Abdomen is flat. Bowel sounds are normal. There is no distension.      Palpations: Abdomen is soft. There is no mass.      Tenderness: There is no abdominal tenderness.   Musculoskeletal:         General: No swelling or deformity. Normal range of motion.      Cervical back: Normal range of motion and neck supple.      Right lower leg: No edema.      Left lower leg: No edema.   Lymphadenopathy:      Cervical: No cervical adenopathy.   Skin:     General: Skin is warm and dry.      Capillary Refill: Capillary refill takes less than 2 seconds.      Findings: No lesion or rash.   Neurological:      General: No focal deficit present.      Mental Status: He is alert and oriented to person, place, and time.      Cranial Nerves: No cranial nerve deficit.      Motor: No weakness.   Psychiatric:         Mood and Affect: Mood normal.         Behavior: Behavior normal.         Thought Content: Thought content normal.         Judgment: Judgment normal.         Assessment/Plan   Problem List Items Addressed This Visit       Coronary artery disease involving native coronary artery of native heart without angina pectoris    Gastroesophageal reflux disease  without esophagitis    Hypertension, essential    Prostate cancer metastatic to bone (Multi)    Panlobular emphysema (Multi) - Primary    Relevant Orders    Follow Up In [facility] - PCP       Patient education provided.  Stay current with age appropriate health maintenance as instructed.  Appointment here or ER with new or worsening symptoms'  Keep appropriate follow-up visit.  Stay current with proper immunizations   recheck 3 months and as needed  Stay current with health maintenance  Keep follow-up with specialist  Discussed at length with patient

## 2024-08-20 ENCOUNTER — APPOINTMENT (OUTPATIENT)
Dept: PRIMARY CARE | Facility: CLINIC | Age: 74
End: 2024-08-20
Payer: MEDICARE

## 2024-08-20 VITALS
DIASTOLIC BLOOD PRESSURE: 70 MMHG | HEIGHT: 70 IN | SYSTOLIC BLOOD PRESSURE: 104 MMHG | BODY MASS INDEX: 30.03 KG/M2 | OXYGEN SATURATION: 95 % | HEART RATE: 73 BPM | WEIGHT: 209.8 LBS

## 2024-08-20 DIAGNOSIS — I25.10 CORONARY ARTERY DISEASE INVOLVING NATIVE CORONARY ARTERY OF NATIVE HEART WITHOUT ANGINA PECTORIS: ICD-10-CM

## 2024-08-20 DIAGNOSIS — K21.9 GASTROESOPHAGEAL REFLUX DISEASE WITHOUT ESOPHAGITIS: ICD-10-CM

## 2024-08-20 DIAGNOSIS — J44.9 COPD WITH HYPOXIA (MULTI): Primary | ICD-10-CM

## 2024-08-20 DIAGNOSIS — I10 HYPERTENSION, ESSENTIAL: ICD-10-CM

## 2024-08-20 DIAGNOSIS — C79.51 PROSTATE CANCER METASTATIC TO BONE (MULTI): ICD-10-CM

## 2024-08-20 DIAGNOSIS — J43.1 PANLOBULAR EMPHYSEMA (MULTI): Primary | ICD-10-CM

## 2024-08-20 DIAGNOSIS — C61 PROSTATE CANCER METASTATIC TO BONE (MULTI): ICD-10-CM

## 2024-08-20 PROBLEM — F17.200 SMOKER: Status: RESOLVED | Noted: 2019-03-25 | Resolved: 2024-08-20

## 2024-08-20 PROCEDURE — 1036F TOBACCO NON-USER: CPT | Performed by: FAMILY MEDICINE

## 2024-08-20 PROCEDURE — 1159F MED LIST DOCD IN RCRD: CPT | Performed by: FAMILY MEDICINE

## 2024-08-20 PROCEDURE — 1160F RVW MEDS BY RX/DR IN RCRD: CPT | Performed by: FAMILY MEDICINE

## 2024-08-20 PROCEDURE — 1123F ACP DISCUSS/DSCN MKR DOCD: CPT | Performed by: FAMILY MEDICINE

## 2024-08-20 PROCEDURE — 3008F BODY MASS INDEX DOCD: CPT | Performed by: FAMILY MEDICINE

## 2024-08-20 PROCEDURE — 3078F DIAST BP <80 MM HG: CPT | Performed by: FAMILY MEDICINE

## 2024-08-20 PROCEDURE — 3074F SYST BP LT 130 MM HG: CPT | Performed by: FAMILY MEDICINE

## 2024-08-20 PROCEDURE — 99214 OFFICE O/P EST MOD 30 MIN: CPT | Performed by: FAMILY MEDICINE

## 2024-08-20 RX ORDER — ALBUTEROL SULFATE 90 UG/1
2 INHALANT RESPIRATORY (INHALATION) EVERY 4 HOURS PRN
Qty: 18 G | Refills: 3 | Status: SHIPPED | OUTPATIENT
Start: 2024-08-20

## 2024-08-20 RX ORDER — FLUTICASONE FUROATE, UMECLIDINIUM BROMIDE AND VILANTEROL TRIFENATATE 100; 62.5; 25 UG/1; UG/1; UG/1
POWDER RESPIRATORY (INHALATION)
COMMUNITY

## 2024-08-20 ASSESSMENT — ENCOUNTER SYMPTOMS
NECK PAIN: 0
EYE DISCHARGE: 0
COUGH: 0
HEADACHES: 0
DIZZINESS: 0
NERVOUS/ANXIOUS: 0
WEAKNESS: 0
DYSPHORIC MOOD: 0
NAUSEA: 0
ABDOMINAL PAIN: 0
DIFFICULTY URINATING: 0
NUMBNESS: 0
FATIGUE: 0
ADENOPATHY: 0
CHEST TIGHTNESS: 0
ARTHRALGIAS: 0
SHORTNESS OF BREATH: 0
BACK PAIN: 0
ACTIVITY CHANGE: 0
DIARRHEA: 0
SORE THROAT: 0
CONSTIPATION: 0
BRUISES/BLEEDS EASILY: 0
HEMATURIA: 0
VOMITING: 0
BLOOD IN STOOL: 0
MYALGIAS: 0

## 2024-08-20 NOTE — TELEPHONE ENCOUNTER
Rx Refill Request Telephone Encounter    Name:  Johnnie Arceo  :  [id number]  Medication Name:  albuterol 90 mcg/actuation inhaler     Specific Pharmacy location:  [facility] [address]  [place]   Date of last appointment:  24  Date of next appointment:  N/A  Best number to reach patient:  [phone]

## 2024-09-11 NOTE — PROGRESS NOTES
Subjective   Patient ID: Johnnie Arceo is a 74 y.o. male who presents for Hospital Follow-up.  HPI  Patient here for follow-up from hospital  Apparently not a transition of care visit  Patient was hospitalized after chemo  He was dehydrated had electrolyte disturbance was very fatigued had low blood pressure  They stopped BP meds in the hospital  He is essentially back to normal now here with his spouse    Ongoing therapy for his metastatic cancer  He is getting further testing and will need additional treatments as well    Blood pressure controlled at this time he is off his blood pressure meds    High cholesterol stable watch diet follow blood work    COPD ongoing seen by pulmonary  He also uses oxygen for hypoxia    Chronic pain ongoing and stable  Pain medicines are helpful    Coronary disease stable no angina    GERD stable no red flag symptoms      Review of Systems   Constitutional:  Negative for activity change and fatigue.   HENT:  Negative for congestion and sore throat.    Eyes:  Negative for discharge.   Respiratory:  Negative for cough, chest tightness and shortness of breath.    Cardiovascular:  Negative for chest pain and leg swelling.   Gastrointestinal:  Negative for abdominal pain, blood in stool, constipation, diarrhea, nausea and vomiting.   Endocrine: Negative for cold intolerance and heat intolerance.   Genitourinary:  Negative for difficulty urinating and hematuria.   Musculoskeletal:  Negative for arthralgias, back pain, gait problem, myalgias and neck pain.   Allergic/Immunologic: Negative for environmental allergies.   Neurological:  Negative for dizziness, syncope, weakness, numbness and headaches.   Hematological:  Negative for adenopathy. Does not bruise/bleed easily.   Psychiatric/Behavioral:  Negative for dysphoric mood. The patient is not nervous/anxious.    All other systems reviewed and are negative.      Objective   /66 (BP Location: Right arm, BP Cuff Size: Large adult)    Pulse 68   Wt 92.4 kg (203 lb 9.6 oz)   SpO2 91%   BMI 29.21 kg/m²    Physical Exam  Vitals and nursing note reviewed.   Constitutional:       General: He is not in acute distress.     Appearance: Normal appearance. He is obese.   HENT:      Head: Normocephalic and atraumatic.      Right Ear: Tympanic membrane, ear canal and external ear normal.      Left Ear: Tympanic membrane, ear canal and external ear normal.      Nose: Nose normal.      Mouth/Throat:      Mouth: Mucous membranes are moist.      Pharynx: Oropharynx is clear. No oropharyngeal exudate or posterior oropharyngeal erythema.   Eyes:      Extraocular Movements: Extraocular movements intact.      Conjunctiva/sclera: Conjunctivae normal.      Pupils: Pupils are equal, round, and reactive to light.   Cardiovascular:      Rate and Rhythm: Normal rate and regular rhythm.      Pulses: Normal pulses.      Heart sounds: Normal heart sounds. No murmur heard.  Pulmonary:      Effort: Pulmonary effort is normal. No respiratory distress.      Breath sounds: Normal breath sounds. No wheezing or rales.   Abdominal:      General: Abdomen is flat. Bowel sounds are normal. There is no distension.      Palpations: Abdomen is soft. There is no mass.      Tenderness: There is no abdominal tenderness.   Musculoskeletal:         General: No swelling or deformity. Normal range of motion.      Cervical back: Normal range of motion and neck supple.      Right lower leg: No edema.      Left lower leg: No edema.   Lymphadenopathy:      Cervical: No cervical adenopathy.   Skin:     General: Skin is warm and dry.      Capillary Refill: Capillary refill takes less than 2 seconds.      Findings: No lesion or rash.   Neurological:      General: No focal deficit present.      Mental Status: He is alert and oriented to person, place, and time.      Cranial Nerves: No cranial nerve deficit.      Motor: No weakness.   Psychiatric:         Mood and Affect: Mood normal.          Behavior: Behavior normal.         Thought Content: Thought content normal.         Judgment: Judgment normal.         Assessment/Plan   Problem List Items Addressed This Visit       Benign prostatic hyperplasia with urinary hesitancy    Coronary artery disease involving native coronary artery of native heart without angina pectoris - Primary    COPD with hypoxia (Multi)    Gastroesophageal reflux disease without esophagitis    Prostate cancer metastatic to bone (Multi)     Other Visit Diagnoses       Near syncope        Relevant Orders    Follow Up In [facility] - PCP            Patient education provided.  Stay current with age appropriate health maintenance as instructed.  Appointment here or ER with new or worsening symptoms'  Keep appropriate follow-up visit.  Stay current with proper immunizations   Discussed at length with patient and family member  3-month recheck  Return sooner with new or worsening symptoms  Keep specialist follow-up  Discussed at length with patient

## 2024-09-13 ENCOUNTER — OFFICE VISIT (OUTPATIENT)
Dept: PRIMARY CARE | Facility: CLINIC | Age: 74
End: 2024-09-13
Payer: MEDICARE

## 2024-09-13 VITALS
WEIGHT: 203.6 LBS | BODY MASS INDEX: 29.21 KG/M2 | OXYGEN SATURATION: 91 % | DIASTOLIC BLOOD PRESSURE: 66 MMHG | HEART RATE: 68 BPM | SYSTOLIC BLOOD PRESSURE: 134 MMHG

## 2024-09-13 DIAGNOSIS — R55 NEAR SYNCOPE: ICD-10-CM

## 2024-09-13 DIAGNOSIS — C61 PROSTATE CANCER METASTATIC TO BONE (MULTI): ICD-10-CM

## 2024-09-13 DIAGNOSIS — C79.51 PROSTATE CANCER METASTATIC TO BONE (MULTI): ICD-10-CM

## 2024-09-13 DIAGNOSIS — N40.1 BENIGN PROSTATIC HYPERPLASIA WITH URINARY HESITANCY: ICD-10-CM

## 2024-09-13 DIAGNOSIS — J44.9 COPD WITH HYPOXIA (MULTI): ICD-10-CM

## 2024-09-13 DIAGNOSIS — R39.11 BENIGN PROSTATIC HYPERPLASIA WITH URINARY HESITANCY: ICD-10-CM

## 2024-09-13 DIAGNOSIS — K21.9 GASTROESOPHAGEAL REFLUX DISEASE WITHOUT ESOPHAGITIS: ICD-10-CM

## 2024-09-13 DIAGNOSIS — I25.10 CORONARY ARTERY DISEASE INVOLVING NATIVE CORONARY ARTERY OF NATIVE HEART WITHOUT ANGINA PECTORIS: Primary | ICD-10-CM

## 2024-09-13 PROCEDURE — 99214 OFFICE O/P EST MOD 30 MIN: CPT | Performed by: FAMILY MEDICINE

## 2024-09-13 PROCEDURE — 1159F MED LIST DOCD IN RCRD: CPT | Performed by: FAMILY MEDICINE

## 2024-09-13 PROCEDURE — 3078F DIAST BP <80 MM HG: CPT | Performed by: FAMILY MEDICINE

## 2024-09-13 PROCEDURE — 1160F RVW MEDS BY RX/DR IN RCRD: CPT | Performed by: FAMILY MEDICINE

## 2024-09-13 PROCEDURE — 1123F ACP DISCUSS/DSCN MKR DOCD: CPT | Performed by: FAMILY MEDICINE

## 2024-09-13 PROCEDURE — 3075F SYST BP GE 130 - 139MM HG: CPT | Performed by: FAMILY MEDICINE

## 2024-09-13 PROCEDURE — 1036F TOBACCO NON-USER: CPT | Performed by: FAMILY MEDICINE

## 2024-09-13 RX ORDER — OXYCODONE HYDROCHLORIDE 5 MG/1
5-10 TABLET ORAL EVERY 4 HOURS PRN
COMMUNITY
Start: 2024-08-21 | End: 2024-09-20

## 2024-09-13 RX ORDER — PREDNISONE 5 MG/1
5 TABLET ORAL
COMMUNITY
Start: 2024-09-10

## 2024-09-13 RX ORDER — ONDANSETRON HYDROCHLORIDE 8 MG/1
8 TABLET, FILM COATED ORAL EVERY 8 HOURS PRN
COMMUNITY
Start: 2024-07-10

## 2024-09-13 ASSESSMENT — ENCOUNTER SYMPTOMS
MYALGIAS: 0
HEADACHES: 0
ADENOPATHY: 0
ARTHRALGIAS: 0
CHEST TIGHTNESS: 0
NECK PAIN: 0
ABDOMINAL PAIN: 0
NERVOUS/ANXIOUS: 0
BACK PAIN: 0
ACTIVITY CHANGE: 0
FATIGUE: 0
DIFFICULTY URINATING: 0
WEAKNESS: 0
CONSTIPATION: 0
SHORTNESS OF BREATH: 0
BRUISES/BLEEDS EASILY: 0
VOMITING: 0
DYSPHORIC MOOD: 0
BLOOD IN STOOL: 0
HEMATURIA: 0
COUGH: 0
DIARRHEA: 0
NUMBNESS: 0
NAUSEA: 0
EYE DISCHARGE: 0
DIZZINESS: 0
SORE THROAT: 0

## 2024-09-26 PROBLEM — Z79.4 LONG TERM (CURRENT) USE OF INSULIN (MULTI): Status: ACTIVE | Noted: 2024-09-26

## 2024-10-08 ENCOUNTER — TELEPHONE (OUTPATIENT)
Dept: PRIMARY CARE | Facility: CLINIC | Age: 74
End: 2024-10-08
Payer: MEDICARE

## 2024-11-27 DIAGNOSIS — I10 HYPERTENSION, ESSENTIAL: ICD-10-CM

## 2024-11-29 RX ORDER — HYDROCHLOROTHIAZIDE 25 MG/1
25 TABLET ORAL DAILY
Qty: 90 TABLET | Refills: 0 | Status: SHIPPED | OUTPATIENT
Start: 2024-11-29

## 2024-11-29 NOTE — TELEPHONE ENCOUNTER
Rx Refill Request     Name: Johnnie Arceo  :  1950   Medication Name:  hydroCHLOROthiazide (HYDRODiuril) 25 mg tablet   Specific Pharmacy location:  [facility]   Date of last appointment:  2024   Date of next appointment:  2024   Best number to reach patient:  [phone]       PATIENT IS OUT OF MEDICATION.

## 2024-12-02 DIAGNOSIS — E87.6 HYPOKALEMIA: ICD-10-CM

## 2024-12-02 RX ORDER — POTASSIUM CHLORIDE 20 MEQ/1
20 TABLET, EXTENDED RELEASE ORAL DAILY
Qty: 90 TABLET | Refills: 1 | Status: SHIPPED | OUTPATIENT
Start: 2024-12-02

## 2024-12-02 NOTE — TELEPHONE ENCOUNTER
Rx Refill Request     Name: Johnnie Arceo  :  1950   Medication Name:    potassium chloride CR 20 mEq ER tablet - Take 1 tablet (20 mEq) by mouth once daily.   Specific Pharmacy location:  [place], [place]  Date of last appointment:  2024   Date of next appointment:  2024   Best number to reach patient:  [phone]

## 2024-12-13 ENCOUNTER — APPOINTMENT (OUTPATIENT)
Dept: PRIMARY CARE | Facility: CLINIC | Age: 74
End: 2024-12-13
Payer: MEDICARE

## 2025-01-07 DIAGNOSIS — I73.9 PERIPHERAL VASCULAR DISEASE, UNSPECIFIED (CMS-HCC): ICD-10-CM

## 2025-01-10 ENCOUNTER — TELEPHONE (OUTPATIENT)
Dept: PRIMARY CARE | Facility: CLINIC | Age: 75
End: 2025-01-10
Payer: MEDICARE

## 2025-01-10 NOTE — TELEPHONE ENCOUNTER
ZENOBIA CALLED IN TO LET OUR OFFICE KNOW TO BE ON THE LOOKOUT FOR A FAX FROM THE [facility] STATING THAT THEY ARE FAXING OVER A REQUEST SO HE CAN GET IN WITH THEIR OFFICE SOONER.     [facility]   PHONE# [phone]

## 2025-02-24 ENCOUNTER — APPOINTMENT (OUTPATIENT)
Dept: PRIMARY CARE | Facility: CLINIC | Age: 75
End: 2025-02-24
Payer: MEDICARE

## 2025-02-25 ENCOUNTER — APPOINTMENT (OUTPATIENT)
Dept: PRIMARY CARE | Facility: CLINIC | Age: 75
End: 2025-02-25
Payer: MEDICARE

## 2025-02-25 ENCOUNTER — TELEMEDICINE (OUTPATIENT)
Dept: PRIMARY CARE | Facility: CLINIC | Age: 75
End: 2025-02-25
Payer: MEDICARE

## 2025-02-25 ENCOUNTER — PATIENT MESSAGE (OUTPATIENT)
Dept: PRIMARY CARE | Facility: CLINIC | Age: 75
End: 2025-02-25

## 2025-02-25 DIAGNOSIS — K21.9 GASTROESOPHAGEAL REFLUX DISEASE WITHOUT ESOPHAGITIS: ICD-10-CM

## 2025-02-25 DIAGNOSIS — C79.51 PROSTATE CANCER METASTATIC TO BONE (MULTI): ICD-10-CM

## 2025-02-25 DIAGNOSIS — I10 HYPERTENSION, ESSENTIAL: ICD-10-CM

## 2025-02-25 DIAGNOSIS — I25.10 CORONARY ARTERY DISEASE INVOLVING NATIVE CORONARY ARTERY OF NATIVE HEART WITHOUT ANGINA PECTORIS: ICD-10-CM

## 2025-02-25 DIAGNOSIS — Z79.4 LONG TERM (CURRENT) USE OF INSULIN (MULTI): ICD-10-CM

## 2025-02-25 DIAGNOSIS — F33.0 DEPRESSION, MAJOR, RECURRENT, MILD (CMS-HCC): Primary | ICD-10-CM

## 2025-02-25 DIAGNOSIS — C79.51 MALIGNANT NEOPLASM METASTATIC TO BONE (MULTI): ICD-10-CM

## 2025-02-25 DIAGNOSIS — J43.1 PANLOBULAR EMPHYSEMA (MULTI): ICD-10-CM

## 2025-02-25 DIAGNOSIS — C61 PROSTATE CANCER METASTATIC TO BONE (MULTI): ICD-10-CM

## 2025-02-25 DIAGNOSIS — J44.9 COPD WITH HYPOXIA (MULTI): ICD-10-CM

## 2025-02-25 DIAGNOSIS — M1A.09X0 IDIOPATHIC CHRONIC GOUT OF MULTIPLE SITES WITHOUT TOPHUS: ICD-10-CM

## 2025-02-25 PROCEDURE — 99214 OFFICE O/P EST MOD 30 MIN: CPT | Performed by: FAMILY MEDICINE

## 2025-02-25 PROCEDURE — 1159F MED LIST DOCD IN RCRD: CPT | Performed by: FAMILY MEDICINE

## 2025-02-25 PROCEDURE — 1160F RVW MEDS BY RX/DR IN RCRD: CPT | Performed by: FAMILY MEDICINE

## 2025-02-25 PROCEDURE — 1036F TOBACCO NON-USER: CPT | Performed by: FAMILY MEDICINE

## 2025-02-25 PROCEDURE — 1123F ACP DISCUSS/DSCN MKR DOCD: CPT | Performed by: FAMILY MEDICINE

## 2025-02-25 RX ORDER — ALLOPURINOL 100 MG/1
100 TABLET ORAL 2 TIMES DAILY
Qty: 60 TABLET | Refills: 11 | Status: SHIPPED | OUTPATIENT
Start: 2025-02-25 | End: 2025-02-27 | Stop reason: SDUPTHER

## 2025-02-25 RX ORDER — BUPROPION HYDROCHLORIDE 150 MG/1
150 TABLET ORAL EVERY MORNING
Qty: 30 TABLET | Refills: 5 | Status: SHIPPED | OUTPATIENT
Start: 2025-02-25 | End: 2025-08-24

## 2025-02-25 ASSESSMENT — ENCOUNTER SYMPTOMS
HEMATURIA: 0
ADENOPATHY: 0
CONSTIPATION: 0
NECK PAIN: 0
COUGH: 0
EYE DISCHARGE: 0
ARTHRALGIAS: 0
DYSPHORIC MOOD: 0
DIARRHEA: 0
ABDOMINAL PAIN: 0
FATIGUE: 0
NAUSEA: 0
WEAKNESS: 0
VOMITING: 0
MYALGIAS: 0
CHEST TIGHTNESS: 0
SHORTNESS OF BREATH: 0
NUMBNESS: 0
BLOOD IN STOOL: 0
BRUISES/BLEEDS EASILY: 0
ACTIVITY CHANGE: 0
SORE THROAT: 0
BACK PAIN: 0
HEADACHES: 0
DIZZINESS: 0
NERVOUS/ANXIOUS: 0
DIFFICULTY URINATING: 0

## 2025-02-25 NOTE — PROGRESS NOTES
Subjective   Patient ID: Johnnie Arceo is a 75 y.o. male who presents for Medication Question.  HPI  Virtual visit    Thinks he has gout  Would like prescription for allopurinol  Was diagnosed many decades ago  Recommend low purine diet as well    Patient with some edema  Discussed benefit of diuretic therapy  He has hydrochlorothiazide  Will continue this  Further testing if symptoms persist or worsen  Consider further cardiac testing    High cholesterol stable continue Lipitor    Talk about depression medication  Going through cancer treatments  Doing counseling  No suicidal ideation no psychotic or manic symptoms    Patient here with significant other    Patient undergoing treatment for metastatic prostate cancer      Review of Systems   Constitutional:  Negative for activity change and fatigue.   HENT:  Negative for congestion and sore throat.    Eyes:  Negative for discharge.   Respiratory:  Negative for cough, chest tightness and shortness of breath.    Cardiovascular:  Negative for chest pain and leg swelling.   Gastrointestinal:  Negative for abdominal pain, blood in stool, constipation, diarrhea, nausea and vomiting.   Endocrine: Negative for cold intolerance and heat intolerance.   Genitourinary:  Negative for difficulty urinating and hematuria.   Musculoskeletal:  Negative for arthralgias, back pain, gait problem, myalgias and neck pain.   Allergic/Immunologic: Negative for environmental allergies.   Neurological:  Negative for dizziness, syncope, weakness, numbness and headaches.   Hematological:  Negative for adenopathy. Does not bruise/bleed easily.   Psychiatric/Behavioral:  Negative for dysphoric mood. The patient is not nervous/anxious.    All other systems reviewed and are negative.      Objective   There were no vitals taken for this visit.   Physical Exam  virtual  Assessment/Plan   Problem List Items Addressed This Visit       Coronary artery disease involving native coronary artery of native  heart without angina pectoris    COPD with hypoxia (Multi)    Gastroesophageal reflux disease without esophagitis    Hypertension, essential    Malignant neoplasm metastatic to bone (Multi)    Prostate cancer metastatic to bone (Multi)    Panlobular emphysema (Multi)    Long term (current) use of insulin (Multi)    Depression, major, recurrent, mild (CMS-HCC) - Primary    Relevant Medications    buPROPion XL (Wellbutrin XL) 150 mg 24 hr tablet    Idiopathic chronic gout of multiple sites without tophus    Relevant Medications    allopurinol (Zyloprim) 100 mg tablet       Patient education provided.  Stay current with age appropriate health maintenance as instructed.  Appointment here or ER with new or worsening symptoms'  Keep appropriate follow-up visit.  Stay current with proper immunizations     Report suicidal ideation  Report psychotic or manic symptoms  Follow-up visit 3 months  Meds as above  Keep specialist follow-up  Discussed at length with patient and significant other  Continue counseling

## 2025-02-27 RX ORDER — ALLOPURINOL 100 MG/1
100 TABLET ORAL 2 TIMES DAILY
Qty: 60 TABLET | Refills: 1 | Status: SHIPPED | OUTPATIENT
Start: 2025-02-27 | End: 2026-02-27

## 2025-03-31 DIAGNOSIS — J44.9 COPD WITH HYPOXIA (MULTI): ICD-10-CM

## 2025-03-31 RX ORDER — ALBUTEROL SULFATE 90 UG/1
2 INHALANT RESPIRATORY (INHALATION) EVERY 4 HOURS PRN
Qty: 18 G | Refills: 3 | Status: SHIPPED | OUTPATIENT
Start: 2025-03-31

## 2025-03-31 NOTE — TELEPHONE ENCOUNTER
Rx Refill Request Telephone Encounter    Name:  Johnnie Arceo  :  [id number]  Medication Name:  albuterol 90 mcg/actuation inhaler   Specific Pharmacy location:  [id number] [address] - [place], [place]  Date of last appointment:  2024  Date of next appointment:    Best number to reach patient:  [phone]

## 2025-04-10 ENCOUNTER — TELEPHONE (OUTPATIENT)
Dept: PRIMARY CARE | Facility: CLINIC | Age: 75
End: 2025-04-10

## 2025-04-10 NOTE — TELEPHONE ENCOUNTER
Johnnie and his wife called in together - they are still in [place], but now concerned with medications that he was prescribed while in the hospital there. He had swelling in his legs, so hospital prescribed lasix med for him at discharge. Swelling has gone down some, but he has gained 8 pounds within a few days.   Also, he was put on Metoprolol  and now she is concerned that his blood pressure is too low... one check was 101/68 in the morning, another reading was 87/60. Johnnie is not having any pain or symptoms, stated he feels fine.  They asked if Dr. Lawton could call them and advise them on what to do regarding the blood pressure med, the lasix, and the leg swelling. Please advise.

## 2025-05-12 DIAGNOSIS — M1A.09X0 IDIOPATHIC CHRONIC GOUT OF MULTIPLE SITES WITHOUT TOPHUS: ICD-10-CM

## 2025-05-12 RX ORDER — ALLOPURINOL 100 MG/1
100 TABLET ORAL 2 TIMES DAILY
Qty: 60 TABLET | Refills: 1 | Status: SHIPPED | OUTPATIENT
Start: 2025-05-12 | End: 2026-05-12

## 2025-05-12 NOTE — TELEPHONE ENCOUNTER
Rx Refill Request     Name: Johnnie Arceo  :  1950   Medication Name:  ALLPURINOL 100MG  Specific Pharmacy location:  [facility]  Date of last appointment:  2024   Date of next appointment:  2025   Best number to reach patient:  [phone]

## 2025-05-13 ENCOUNTER — APPOINTMENT (OUTPATIENT)
Dept: PRIMARY CARE | Facility: CLINIC | Age: 75
End: 2025-05-13
Payer: MEDICARE

## 2025-05-13 VITALS
HEIGHT: 70 IN | SYSTOLIC BLOOD PRESSURE: 114 MMHG | BODY MASS INDEX: 30.41 KG/M2 | DIASTOLIC BLOOD PRESSURE: 63 MMHG | OXYGEN SATURATION: 95 % | HEART RATE: 52 BPM | WEIGHT: 212.4 LBS

## 2025-05-13 DIAGNOSIS — E78.2 HYPERLIPIDEMIA, MIXED: ICD-10-CM

## 2025-05-13 DIAGNOSIS — F33.0 DEPRESSION, MAJOR, RECURRENT, MILD: ICD-10-CM

## 2025-05-13 DIAGNOSIS — C79.51 PROSTATE CANCER METASTATIC TO BONE (MULTI): ICD-10-CM

## 2025-05-13 DIAGNOSIS — M1A.09X0 IDIOPATHIC CHRONIC GOUT OF MULTIPLE SITES WITHOUT TOPHUS: ICD-10-CM

## 2025-05-13 DIAGNOSIS — C61 PROSTATE CANCER METASTATIC TO BONE (MULTI): ICD-10-CM

## 2025-05-13 DIAGNOSIS — R73.02 GLUCOSE INTOLERANCE (IMPAIRED GLUCOSE TOLERANCE): ICD-10-CM

## 2025-05-13 DIAGNOSIS — R55 NEAR SYNCOPE: Primary | ICD-10-CM

## 2025-05-13 DIAGNOSIS — I10 HYPERTENSION, ESSENTIAL: ICD-10-CM

## 2025-05-13 DIAGNOSIS — I25.10 CORONARY ARTERY DISEASE INVOLVING NATIVE CORONARY ARTERY OF NATIVE HEART WITHOUT ANGINA PECTORIS: ICD-10-CM

## 2025-05-13 DIAGNOSIS — J43.1 PANLOBULAR EMPHYSEMA (MULTI): ICD-10-CM

## 2025-05-13 DIAGNOSIS — K21.9 GASTROESOPHAGEAL REFLUX DISEASE WITHOUT ESOPHAGITIS: ICD-10-CM

## 2025-05-13 PROCEDURE — 3008F BODY MASS INDEX DOCD: CPT | Performed by: FAMILY MEDICINE

## 2025-05-13 PROCEDURE — 99214 OFFICE O/P EST MOD 30 MIN: CPT | Performed by: FAMILY MEDICINE

## 2025-05-13 PROCEDURE — 3074F SYST BP LT 130 MM HG: CPT | Performed by: FAMILY MEDICINE

## 2025-05-13 PROCEDURE — 1159F MED LIST DOCD IN RCRD: CPT | Performed by: FAMILY MEDICINE

## 2025-05-13 PROCEDURE — 1160F RVW MEDS BY RX/DR IN RCRD: CPT | Performed by: FAMILY MEDICINE

## 2025-05-13 PROCEDURE — 3078F DIAST BP <80 MM HG: CPT | Performed by: FAMILY MEDICINE

## 2025-05-13 RX ORDER — OXYCODONE HYDROCHLORIDE 5 MG/1
1 TABLET ORAL EVERY 4 HOURS PRN
COMMUNITY

## 2025-05-13 RX ORDER — FUROSEMIDE 20 MG/1
20 TABLET ORAL DAILY
COMMUNITY
Start: 2025-04-05 | End: 2025-05-13 | Stop reason: SDUPTHER

## 2025-05-13 RX ORDER — LEUPROLIDE ACETATE 22.5 MG
22.5 SYRINGE (EA) SUBCUTANEOUS
COMMUNITY

## 2025-05-13 RX ORDER — APIXABAN 5 MG/1
5 TABLET, FILM COATED ORAL 2 TIMES DAILY
COMMUNITY
Start: 2025-04-02

## 2025-05-13 RX ORDER — BUPRENORPHINE 15 UG/H
PATCH TRANSDERMAL
COMMUNITY

## 2025-05-13 RX ORDER — METOPROLOL SUCCINATE 25 MG/1
25 TABLET, EXTENDED RELEASE ORAL DAILY
COMMUNITY
Start: 2025-04-02

## 2025-05-13 RX ORDER — AMLODIPINE BESYLATE 5 MG/1
5 TABLET ORAL DAILY
COMMUNITY
End: 2025-05-13 | Stop reason: WASHOUT

## 2025-05-13 RX ORDER — FUROSEMIDE 20 MG/1
20 TABLET ORAL DAILY
Qty: 90 TABLET | Refills: 1 | Status: SHIPPED | OUTPATIENT
Start: 2025-05-13 | End: 2026-05-13

## 2025-05-13 RX ORDER — BENZONATATE 100 MG/1
100 CAPSULE ORAL 3 TIMES DAILY PRN
COMMUNITY

## 2025-05-13 ASSESSMENT — ENCOUNTER SYMPTOMS
CONSTIPATION: 0
BRUISES/BLEEDS EASILY: 0
HEMATURIA: 0
CHEST TIGHTNESS: 0
NAUSEA: 0
MYALGIAS: 0
FATIGUE: 0
DYSPHORIC MOOD: 0
BACK PAIN: 0
SORE THROAT: 0
ABDOMINAL PAIN: 0
COUGH: 0
VOMITING: 0
DIZZINESS: 0
ARTHRALGIAS: 0
NUMBNESS: 0
WEAKNESS: 0
EYE DISCHARGE: 0
BLOOD IN STOOL: 0
DIFFICULTY URINATING: 0
NERVOUS/ANXIOUS: 0
SHORTNESS OF BREATH: 0
DIARRHEA: 0
HEADACHES: 0
ACTIVITY CHANGE: 0
ADENOPATHY: 0
NECK PAIN: 0

## 2025-05-13 NOTE — PROGRESS NOTES
Subjective   Patient ID: Johnnie Arceo is a 75 y.o. male who presents for Follow-up.  HPI    Go over med list    Has been getting really low BP readings at home     Patient here today with spouse    Patient with multiple issues    Metastatic prostate cancer undergoing treatments  It is affecting his lifestyle greatly  He has a lot of back pain and is on chronic narcotics  We discussed his chronic narcotic use that he gets from pain management    Mood symptoms ongoing  His medical condition understandably affects his mood  No suicidal ideation no psychotic or manic symptoms    Patient has COPD and uses multiple medication along with supplemental oxygen  Keep pulmonary follow-up    Coronary disease stable no angina keep cardiac follow-up    Near syncope recommend continue good fluid hydration and reviewed recent blood work    High cholesterol  Follow blood work and treat appropriately    Hypertension with good control  No chest pain or shortness of breath  Might need to decrease some of his diuretic use which could be affecting his near syncope symptoms    GERD stable no red flag symptoms    Gout stable watch low purine diet    Positive glucose intolerance he has had used steroids and this could elevate his glucose recommend low-carb diet    Review of Systems   Constitutional:  Negative for activity change and fatigue.   HENT:  Negative for congestion and sore throat.    Eyes:  Negative for discharge.   Respiratory:  Negative for cough, chest tightness and shortness of breath.    Cardiovascular:  Negative for chest pain and leg swelling.   Gastrointestinal:  Negative for abdominal pain, blood in stool, constipation, diarrhea, nausea and vomiting.   Endocrine: Negative for cold intolerance and heat intolerance.   Genitourinary:  Negative for difficulty urinating and hematuria.   Musculoskeletal:  Negative for arthralgias, back pain, gait problem, myalgias and neck pain.   Allergic/Immunologic: Negative for  "environmental allergies.   Neurological:  Negative for dizziness, syncope, weakness, numbness and headaches.   Hematological:  Negative for adenopathy. Does not bruise/bleed easily.   Psychiatric/Behavioral:  Negative for dysphoric mood. The patient is not nervous/anxious.    All other systems reviewed and are negative.      Objective   /63 (BP Location: Right arm, BP Cuff Size: Large adult)   Pulse 52   Ht 1.778 m (5' 10\")   Wt 96.3 kg (212 lb 6.4 oz)   SpO2 95%   BMI 30.48 kg/m²    Physical Exam  Vitals and nursing note reviewed.   Constitutional:       General: He is not in acute distress.     Appearance: Normal appearance. He is obese.   HENT:      Head: Normocephalic and atraumatic.      Right Ear: Tympanic membrane, ear canal and external ear normal.      Left Ear: Tympanic membrane, ear canal and external ear normal.      Nose: Nose normal.      Mouth/Throat:      Mouth: Mucous membranes are moist.      Pharynx: Oropharynx is clear. No oropharyngeal exudate or posterior oropharyngeal erythema.   Eyes:      Extraocular Movements: Extraocular movements intact.      Conjunctiva/sclera: Conjunctivae normal.      Pupils: Pupils are equal, round, and reactive to light.   Cardiovascular:      Rate and Rhythm: Normal rate and regular rhythm.      Pulses: Normal pulses.      Heart sounds: Normal heart sounds. No murmur heard.  Pulmonary:      Effort: Pulmonary effort is normal. No respiratory distress.      Breath sounds: Normal breath sounds. No wheezing or rales.   Abdominal:      General: Abdomen is flat. Bowel sounds are normal. There is no distension.      Palpations: Abdomen is soft. There is no mass.      Tenderness: There is no abdominal tenderness.   Musculoskeletal:         General: No swelling or deformity. Normal range of motion.      Cervical back: Normal range of motion and neck supple.      Right lower leg: No edema.      Left lower leg: No edema.   Lymphadenopathy:      Cervical: No cervical " adenopathy.   Skin:     General: Skin is warm and dry.      Capillary Refill: Capillary refill takes less than 2 seconds.      Findings: No lesion or rash.   Neurological:      General: No focal deficit present.      Mental Status: He is alert and oriented to person, place, and time.      Cranial Nerves: No cranial nerve deficit.      Motor: No weakness.   Psychiatric:         Mood and Affect: Mood normal.         Behavior: Behavior normal.         Thought Content: Thought content normal.         Judgment: Judgment normal.         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  Stay current with age appropriate health maintenance as instructed.  Appointment here or ER with new or worsening symptoms'  Keep appropriate follow-up visit.  Stay current with proper immunizations   Review all blood work medicines with patient and spouse  Referrals and refills as above  Recheck 3 months and as needed keep specialist follow-up

## 2025-05-19 ENCOUNTER — APPOINTMENT (OUTPATIENT)
Dept: PHARMACY | Facility: HOSPITAL | Age: 75
End: 2025-05-19
Payer: MEDICARE

## 2025-05-19 DIAGNOSIS — R73.02 GLUCOSE INTOLERANCE (IMPAIRED GLUCOSE TOLERANCE): ICD-10-CM

## 2025-05-19 DIAGNOSIS — J43.1 PANLOBULAR EMPHYSEMA (MULTI): Primary | ICD-10-CM

## 2025-05-19 NOTE — ASSESSMENT & PLAN NOTE
Patient feels rarely needs to use albuterol, does feel he gets adequate dose of Trelegy and reports adherence, and oxygen level feels adequate when he is active on current supplemental oxygen level. No cost concerns. Follows with pulmonology. No need for medication changes today.

## 2025-05-19 NOTE — PROGRESS NOTES
Clinical Pharmacy Appointment    Patient ID: Johnnie Arceo is a 75 y.o. male who presents for No chief complaint on file..    Pt is here for First appointment.     Referring Provider: Salazar Lawton MD  PCP: Salazar Lawton MD  Last visit with PCP: 5/13/2025   Next visit with PCP: 7/10/2025    Subjective   Medication Reconciliation:  Changed: no reported changes    Drug Interactions  The following drug interactions were noted:    Anticholinergics can slow potassium absorption and contribute to ulcer risk (cyclobenzaprine)  Multiple CNS depressants  Bupropion may increase bleed risk on Eliquis    Medication System Management  Patient's preferred pharmacy: [facility]  Adherence/Organization: no concerns, follows list  Affordability/Accessibility: Most medications are covered      HPI  PULMONARY ASSESSMENT  Patient has been diagnosed with: Emphysema  Does patient see pulmonology: Yes    Date: 4/28/2025 Dr. L[address] Monzon  has had PFT's completed in last 2 years (9/2023)    Current Regimen  Trelegy Ellipta 100/62.5/25 mcg daily  Albuterol as needed  Daily prednisone 5 mg  Does use oxygen    Clarifications to above regimen: none   Adverse Effects: denies thrush/dry mouth   Appropriate technique? Yes    Symptom Management  Current symptoms: dyspnea and cough  Triggers: activity  Alleviating factors: rest, rescue inhalers  Exercise capacity: resting feels very well, activity he needs the oxygen, limited d/t dyspnea  How often do you use your rescue inhaler? Very rarely  mMRC score: 2-3    Exacerbation Hx  When was your last hospitalization for an exacerbation? 2024  When was the last time you were treated with antibiotics and/or steroids? Taking prednisone 5 mg daily currently   Total number of hospitalizations d/t exacerbation:    Immunization History:  Influenza: Date [last 2021]  PCV13: Date [2016]  PPSV23: Date [2017]  PCV20: Date [n/a]  COVID: Date [last 2021]  RSV: Date [n/a]    Smoking History  He quit smoking  "approximately 3 years ago.      Objective   Allergies[1]  Social History     Social History Narrative    Not on file      Medication Review  Current Outpatient Medications   Medication Instructions    albuterol 90 mcg/actuation inhaler 2 puffs, inhalation, Every 4 hours PRN    allopurinol (ZYLOPRIM) 100 mg, oral, 2 times daily    aspirin 81 mg, Daily RT    atorvastatin (LIPITOR) 20 mg, Daily RT    benzonatate (TESSALON) 100 mg, 3 times daily PRN    buprenorphine (Butrans) 15 mcg/hour APPLY ONE PATCH TO THE SKIN EVERY WEEK FOR 28 DAYS    buPROPion XL (WELLBUTRIN XL) 150 mg, oral, Every morning, Do not crush, chew, or split.    cyclobenzaprine (FLEXERIL) 10 mg, oral, Every 12 hours PRN    Eliquis 5 mg, 2 times daily    furosemide (LASIX) 20 mg, oral, Daily    hydroCHLOROthiazide (HYDRODIURIL) 25 mg, oral, Daily    leuprolide (3 month) 22.5 mg, Every 3 months    magnesium oxide (MAG-OX) 400 mg, Daily    metoprolol succinate XL (TOPROL-XL) 25 mg, Daily    ondansetron (ZOFRAN) 8 mg, Every 8 hours PRN    oxyCODONE (Roxicodone) 5 mg immediate release tablet 1 tablet, Every 4 hours PRN    potassium chloride CR 20 mEq ER tablet 20 mEq, oral, Daily, Do not crush or chew.    predniSONE (DELTASONE) 5 mg, Daily RT    Trelegy Ellipta 100-62.5-25 mcg blister with device INHALE 1 PUFF BY MOUTH DAILY AT SAME TIME.      Vitals  BP Readings from Last 2 Encounters:   05/13/25 114/63   09/13/24 134/66     BMI Readings from Last 1 Encounters:   05/13/25 30.48 kg/m²      Labs  A1C  No results found for: \"HGBA1C\"  BMP  Lab Results   Component Value Date    CALCIUM 8.9 05/02/2019     05/02/2019    K 3.7 05/02/2019    CO2 26 05/02/2019     05/02/2019    BUN 25 (H) 05/02/2019    CREATININE 1.01 05/02/2019     LFTs  Lab Results   Component Value Date    ALT 11 05/01/2019    AST 18 05/01/2019    ALKPHOS 47 05/01/2019    BILITOT 0.6 05/01/2019     FLP  No results found for: \"TRIG\", \"CHOL\", \"LDLF\", \"LDLCALC\", \"HDL\"  Urine " "Microalbumin  No results found for: \"MICROALBCREA\"  Weight Management  Wt Readings from Last 3 Encounters:   05/13/25 96.3 kg (212 lb 6.4 oz)   09/13/24 92.4 kg (203 lb 9.6 oz)   08/20/24 95.2 kg (209 lb 12.8 oz)      There is no height or weight on file to calculate BMI.     Assessment/Plan   Problem List Items Addressed This Visit       Glucose intolerance (impaired glucose tolerance)    Panlobular emphysema (Multi) - Primary    Patient feels rarely needs to use albuterol, does feel he gets adequate dose of Trelegy and reports adherence, and oxygen level feels adequate when he is active on current supplemental oxygen level. No cost concerns. Follows with pulmonology. No need for medication changes today.            Clinical Pharmacist follow-up: as needed for questions, Telehealth visit  Patient is not followed in CCM. (If yes, track minutes under compass yvse at each visit)    Continue all meds under the continuation of care with the referring provider and clinical pharmacy team.    Thank you,  Sandie Pack, PharmD  Clinical Pharmacist  [phone]    Verbal consent to manage patient's drug therapy was obtained from the patient. They were informed they may decline to participate or withdraw from participation in pharmacy services at any time.         [1]   Allergies  Allergen Reactions    Meperidine Unknown     nausea    Ace Inhibitors Unknown     Former BP medication     "

## 2025-05-22 ENCOUNTER — APPOINTMENT (OUTPATIENT)
Dept: PRIMARY CARE | Facility: CLINIC | Age: 75
End: 2025-05-22
Payer: MEDICARE

## 2025-05-23 DIAGNOSIS — I10 HYPERTENSION, ESSENTIAL: ICD-10-CM

## 2025-05-23 RX ORDER — HYDROCHLOROTHIAZIDE 25 MG/1
25 TABLET ORAL DAILY
Qty: 90 TABLET | Refills: 0 | Status: SHIPPED | OUTPATIENT
Start: 2025-05-23

## 2025-05-23 NOTE — TELEPHONE ENCOUNTER
Rx Refill Request     Name: Johnnie Arceo  :  1950   Medication Name:  HYDROCHLOROTHYIZIDE 25MG X90  Specific Pharmacy location:  [facility]  Date of last appointment:  2025   Date of next appointment:  7/10/2025   Best number to reach patient:  [phone]

## 2025-05-30 ENCOUNTER — PATIENT MESSAGE (OUTPATIENT)
Dept: PRIMARY CARE | Facility: CLINIC | Age: 75
End: 2025-05-30
Payer: MEDICARE

## 2025-05-30 DIAGNOSIS — L89.92 PRESSURE INJURY, STAGE 2, UNSPECIFIED LOCATION (MULTI): ICD-10-CM

## 2025-05-30 DIAGNOSIS — L89.92 PRESSURE INJURY, STAGE 2, UNSPECIFIED LOCATION (MULTI): Primary | ICD-10-CM

## 2025-05-30 RX ORDER — SILVER SULFADIAZINE 10 G/1000G
CREAM TOPICAL
Qty: 85 G | Refills: 0 | Status: SHIPPED | OUTPATIENT
Start: 2025-05-30

## 2025-05-30 RX ORDER — SILVER SULFADIAZINE 10 G/1000G
CREAM TOPICAL
Qty: 85 G | Refills: 0 | Status: SHIPPED | OUTPATIENT
Start: 2025-05-30 | End: 2025-05-30

## 2025-07-10 ENCOUNTER — APPOINTMENT (OUTPATIENT)
Dept: PRIMARY CARE | Facility: CLINIC | Age: 75
End: 2025-07-10
Payer: MEDICARE

## 2025-07-10 VITALS
HEART RATE: 62 BPM | HEIGHT: 70 IN | OXYGEN SATURATION: 95 % | WEIGHT: 215 LBS | DIASTOLIC BLOOD PRESSURE: 84 MMHG | SYSTOLIC BLOOD PRESSURE: 129 MMHG | BODY MASS INDEX: 30.78 KG/M2

## 2025-07-10 DIAGNOSIS — F33.0 DEPRESSION, MAJOR, RECURRENT, MILD: ICD-10-CM

## 2025-07-10 DIAGNOSIS — Z00.00 ROUTINE GENERAL MEDICAL EXAMINATION AT HEALTH CARE FACILITY: Primary | ICD-10-CM

## 2025-07-10 DIAGNOSIS — I25.10 CORONARY ARTERY DISEASE INVOLVING NATIVE CORONARY ARTERY OF NATIVE HEART WITHOUT ANGINA PECTORIS: ICD-10-CM

## 2025-07-10 DIAGNOSIS — J44.9 COPD WITH HYPOXIA (MULTI): ICD-10-CM

## 2025-07-10 DIAGNOSIS — I48.0 PAROXYSMAL ATRIAL FIBRILLATION (MULTI): ICD-10-CM

## 2025-07-10 DIAGNOSIS — C61 PROSTATE CANCER METASTATIC TO BONE (MULTI): ICD-10-CM

## 2025-07-10 DIAGNOSIS — J43.1 PANLOBULAR EMPHYSEMA (MULTI): ICD-10-CM

## 2025-07-10 DIAGNOSIS — C79.51 PROSTATE CANCER METASTATIC TO BONE (MULTI): ICD-10-CM

## 2025-07-10 DIAGNOSIS — E55.9 VITAMIN D DEFICIENCY: ICD-10-CM

## 2025-07-10 DIAGNOSIS — E87.6 HYPOKALEMIA: ICD-10-CM

## 2025-07-10 DIAGNOSIS — I10 HYPERTENSION, ESSENTIAL: ICD-10-CM

## 2025-07-10 PROCEDURE — 3074F SYST BP LT 130 MM HG: CPT | Performed by: FAMILY MEDICINE

## 2025-07-10 PROCEDURE — 1160F RVW MEDS BY RX/DR IN RCRD: CPT | Performed by: FAMILY MEDICINE

## 2025-07-10 PROCEDURE — 1170F FXNL STATUS ASSESSED: CPT | Performed by: FAMILY MEDICINE

## 2025-07-10 PROCEDURE — 99214 OFFICE O/P EST MOD 30 MIN: CPT | Performed by: FAMILY MEDICINE

## 2025-07-10 PROCEDURE — 3079F DIAST BP 80-89 MM HG: CPT | Performed by: FAMILY MEDICINE

## 2025-07-10 PROCEDURE — 1124F ACP DISCUSS-NO DSCNMKR DOCD: CPT | Performed by: FAMILY MEDICINE

## 2025-07-10 PROCEDURE — G0439 PPPS, SUBSEQ VISIT: HCPCS | Performed by: FAMILY MEDICINE

## 2025-07-10 PROCEDURE — 3008F BODY MASS INDEX DOCD: CPT | Performed by: FAMILY MEDICINE

## 2025-07-10 PROCEDURE — 1036F TOBACCO NON-USER: CPT | Performed by: FAMILY MEDICINE

## 2025-07-10 PROCEDURE — 1159F MED LIST DOCD IN RCRD: CPT | Performed by: FAMILY MEDICINE

## 2025-07-10 RX ORDER — PSYLLIUM HUSK 0.4 G
1 CAPSULE ORAL DAILY
Qty: 90 TABLET | Refills: 3 | Status: SHIPPED | OUTPATIENT
Start: 2025-07-10 | End: 2026-07-10

## 2025-07-10 RX ORDER — ACETAZOLAMIDE 250 MG/1
500 TABLET ORAL
COMMUNITY
Start: 2025-05-28

## 2025-07-10 RX ORDER — AZITHROMYCIN 250 MG/1
250 TABLET, FILM COATED ORAL DAILY
COMMUNITY

## 2025-07-10 RX ORDER — PSYLLIUM HUSK 0.4 G
1 CAPSULE ORAL DAILY
COMMUNITY
End: 2025-07-10 | Stop reason: SDUPTHER

## 2025-07-10 RX ORDER — METHADONE HYDROCHLORIDE 5 MG/1
2.5 TABLET ORAL EVERY 12 HOURS
COMMUNITY
Start: 2025-06-26

## 2025-07-10 RX ORDER — POTASSIUM CHLORIDE 20 MEQ/1
20 TABLET, EXTENDED RELEASE ORAL DAILY
Qty: 90 TABLET | Refills: 3 | Status: SHIPPED | OUTPATIENT
Start: 2025-07-10

## 2025-07-10 ASSESSMENT — ENCOUNTER SYMPTOMS
DYSPHORIC MOOD: 0
BRUISES/BLEEDS EASILY: 0
HEMATURIA: 0
OCCASIONAL FEELINGS OF UNSTEADINESS: 0
CONSTIPATION: 0
COUGH: 0
ADENOPATHY: 0
FATIGUE: 0
CHEST TIGHTNESS: 0
SHORTNESS OF BREATH: 0
NAUSEA: 0
WEAKNESS: 0
DIZZINESS: 0
SORE THROAT: 0
VOMITING: 0
NERVOUS/ANXIOUS: 0
DIARRHEA: 0
MYALGIAS: 0
DIFFICULTY URINATING: 0
NUMBNESS: 0
NECK PAIN: 0
BACK PAIN: 0
ARTHRALGIAS: 0
ABDOMINAL PAIN: 0
LOSS OF SENSATION IN FEET: 0
ACTIVITY CHANGE: 0
BLOOD IN STOOL: 0
DEPRESSION: 0
HEADACHES: 0
EYE DISCHARGE: 0

## 2025-07-10 ASSESSMENT — ACTIVITIES OF DAILY LIVING (ADL)
DRESSING: INDEPENDENT
MANAGING_FINANCES: INDEPENDENT
BATHING: INDEPENDENT
GROCERY_SHOPPING: INDEPENDENT
TAKING_MEDICATION: INDEPENDENT
DOING_HOUSEWORK: INDEPENDENT

## 2025-07-10 NOTE — ASSESSMENT & PLAN NOTE
Orders:    Referral to Clinical Pharmacy; Future    Follow Up In [facility] - PCP    Follow Up In [facility] - PCP; Future    Follow Up In [facility] - PCP; Future

## 2025-07-10 NOTE — PROGRESS NOTES
Subjective   Reason for Visit: Johnnie Arceo is an 75 y.o. male here for a Medicare Wellness visit.     Past Medical, Surgical, and Family History reviewed and updated in chart.    Reviewed all medications by prescribing practitioner or clinical pharmacist (such as prescriptions, OTCs, herbal therapies and supplements) and documented in the medical record.    HPI    Patient here for annual Medicare wellness    Also general checkup    Go over med list    Improved BP readings at home  No complaints of chest pain or shortness of breath    Patient here today with spouse and his sister    Patient with multiple issues    Metastatic prostate cancer undergoing treatments  May need new treatment as the PSA is rising  It is affecting his lifestyle greatly  He has a lot of back pain and is on chronic narcotics  We discussed his chronic narcotic use that he gets from pain management    Mood symptoms ongoing but reasonably in good control  His medical condition understandably affects his mood  No suicidal ideation no psychotic or manic symptoms    Patient has COPD and uses multiple medication along with supplemental oxygen  Keep pulmonary follow-up  Overall he is stable in this regard    Coronary disease under control  Tolerates medicine  No angina keep cardiac follow-up    Near syncope recommend continue good fluid hydration and reviewed recent blood work    High cholesterol  Follow blood work and treat appropriately    Hypertension with good control  No chest pain or shortness of breath  Might need to decrease some of his diuretic use which could be affecting his near syncope symptoms    GERD under control  Tolerates meds  No red flag symptoms    Gout stable  Tolerates meds  Watch low purine diet    Chronic pain  Wants to use oxycodone as needed  Recommend urine drug screen and CSA    Positive glucose intolerance he has had used steroids and this could elevate his glucose recommend low-carb diet      Patient Care  "Team:  Salazar Lawton MD as PCP - General (Family Medicine)  Salazar Lawton MD as PCP - [employer] PCP     Review of Systems   Constitutional:  Negative for activity change and fatigue.   HENT:  Negative for congestion and sore throat.    Eyes:  Negative for discharge.   Respiratory:  Negative for cough, chest tightness and shortness of breath.    Cardiovascular:  Negative for chest pain and leg swelling.   Gastrointestinal:  Negative for abdominal pain, blood in stool, constipation, diarrhea, nausea and vomiting.   Endocrine: Negative for cold intolerance and heat intolerance.   Genitourinary:  Negative for difficulty urinating and hematuria.   Musculoskeletal:  Negative for arthralgias, back pain, gait problem, myalgias and neck pain.   Allergic/Immunologic: Negative for environmental allergies.   Neurological:  Negative for dizziness, syncope, weakness, numbness and headaches.   Hematological:  Negative for adenopathy. Does not bruise/bleed easily.   Psychiatric/Behavioral:  Negative for dysphoric mood. The patient is not nervous/anxious.    All other systems reviewed and are negative.      Objective   Vitals:  /84 (BP Location: Left arm, BP Cuff Size: Large adult)   Pulse 62   Ht 1.778 m (5' 10\")   Wt 97.5 kg (215 lb)   SpO2 95%   BMI 30.85 kg/m²       Physical Exam  Vitals and nursing note reviewed.   Constitutional:       General: He is not in acute distress.     Appearance: Normal appearance. He is obese.   HENT:      Head: Normocephalic and atraumatic.      Right Ear: Tympanic membrane, ear canal and external ear normal.      Left Ear: Tympanic membrane, ear canal and external ear normal.      Nose: Nose normal.      Mouth/Throat:      Mouth: Mucous membranes are moist.      Pharynx: Oropharynx is clear. No oropharyngeal exudate or posterior oropharyngeal erythema.   Eyes:      Extraocular Movements: Extraocular movements intact.      Conjunctiva/sclera: Conjunctivae " normal.      Pupils: Pupils are equal, round, and reactive to light.   Cardiovascular:      Rate and Rhythm: Normal rate and regular rhythm.      Pulses: Normal pulses.      Heart sounds: Normal heart sounds. No murmur heard.  Pulmonary:      Effort: Pulmonary effort is normal. No respiratory distress.      Breath sounds: Normal breath sounds. No wheezing or rales.   Abdominal:      General: Abdomen is flat. Bowel sounds are normal. There is no distension.      Palpations: Abdomen is soft. There is no mass.      Tenderness: There is no abdominal tenderness.   Musculoskeletal:         General: No swelling or deformity. Normal range of motion.      Cervical back: Normal range of motion and neck supple.      Right lower leg: No edema.      Left lower leg: No edema.   Lymphadenopathy:      Cervical: No cervical adenopathy.   Skin:     General: Skin is warm and dry.      Capillary Refill: Capillary refill takes less than 2 seconds.      Findings: No lesion or rash.   Neurological:      General: No focal deficit present.      Mental Status: He is alert and oriented to person, place, and time.      Cranial Nerves: No cranial nerve deficit.      Motor: No weakness.   Psychiatric:         Mood and Affect: Mood normal.         Behavior: Behavior normal.         Thought Content: Thought content normal.         Judgment: Judgment normal.         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 Do not crush or chew.    Routine general medical examination at health care facility    Orders:    Referral to Clinical Pharmacy; Future    1 Year Follow Up In [facility] - PCP - Wellness Exam; Future    Coronary artery disease involving native coronary artery of native heart without angina pectoris         COPD with hypoxia (Multi)         Hypertension, essential         Panlobular emphysema (Multi)         Depression, major, recurrent, mild         Paroxysmal atrial fibrillation (Multi)         Vitamin D deficiency    Orders:    calcium carbonate-vitamin D3 500 mg-5 mcg (200 unit) tablet; Take 1 tablet by mouth once daily.    ACP reviewed  Patient does use some narcotics  Keep specialist follow-up  Discussed with patient, spouse, sister, 3-month recheck    Patient education provided.  Stay current with age appropriate health maintenance as instructed.  Appointment here or ER with new or worsening symptoms'  Keep appropriate follow-up visit.  Stay current with proper immunizations

## 2025-07-10 NOTE — ASSESSMENT & PLAN NOTE
Orders:    Referral to Clinical Pharmacy; Future    potassium chloride CR 20 mEq ER tablet; Take 1 tablet (20 mEq) by mouth once daily. Do not crush or chew.

## 2025-07-21 DIAGNOSIS — I10 HYPERTENSION, ESSENTIAL: ICD-10-CM

## 2025-07-21 RX ORDER — FUROSEMIDE 20 MG/1
20 TABLET ORAL DAILY
Qty: 90 TABLET | Refills: 1 | Status: SHIPPED | OUTPATIENT
Start: 2025-07-21 | End: 2026-07-21

## 2025-07-21 NOTE — PROGRESS NOTES
Assessment/Plan   Problem List Items Addressed This Visit    None      Subjective   Patient ID: Johnnie Arceo is a 75 y.o. male who presents for No chief complaint on file..    Surgical History[1]   Family History[2]   Social History     Socioeconomic History    Marital status:      Spouse name: Not on file    Number of children: Not on file    Years of education: Not on file    Highest education level: Not on file   Occupational History    Not on file   Tobacco Use    Smoking status: Former     Types: Cigarettes     Passive exposure: Never    Smokeless tobacco: Never   Substance and Sexual Activity    Alcohol use: Yes     Alcohol/week: 1.0 standard drink of alcohol     Types: 1 Cans of beer per week     Comment: 10 weekly    Drug use: Never    Sexual activity: Not on file   Other Topics Concern    Not on file   Social History Narrative    Not on file     Social Drivers of Health     Financial Resource Strain: Low Risk  (8/25/2023)    Received from [facility]    Overall Financial Resource Strain (CARDIA)     Difficulty of Paying Living Expenses: Not very hard   Food Insecurity: No Food Insecurity (4/2/2025)    Received from [facility]    Hunger Vital Sign     Worried About Running Out of Food in the Last Year: Never true     Ran Out of Food in the Last Year: Never true   Transportation Needs: No Transportation Needs (4/2/2025)    Received from [facility].    PRAPARE - Transportation     Lack of Transportation (Medical): No     Lack of Transportation (Non-Medical): No   Physical Activity: Not on file   Stress: Not on file   Social Connections: Not on file   Intimate Partner Violence: Not At Risk (4/2/2025)    Received from [facility]    Humiliation, Afraid, Rape, and Kick questionnaire     Fear of Current or Ex-Partner: No     Emotionally Abused: No     Physically Abused: No     Sexually Abused: No   Housing Stability: Low Risk  (4/2/2025)    Received from [facility]    Housing Stability Vital Sign     Unable to Pay for Housing in the Last Year: No     Number of Times Moved in the Last Year: 0     Homeless in the Last Year: No      Meperidine and Ace inhibitors   Current Rx[3]   There were no vitals filed for this visit.   Problem List Items Addressed This Visit    None     No orders of the defined types were placed in this encounter.       HPI    ROS    PHYSICAL EXAM      No results found for: \"PR1\", \"BMPR1A\", \"CMPLAS\", \"VP3CJYDK\", \"KPSAT\"   No results found for: \"CHOL\", \"LDLCALC\", \"HDLC\", \"LCTRG\", \"CHHDL\"  No results found for: \"TSH\", \"HGBA1C\", \"PSA\"           === 05/01/19 ===    - Impression -  CHEST  1. Acute left lateral 6th through 8th rib fractures with displacement.  2. No evidence of intrathoracic trauma.  3. 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 Consider correlation with CT angiogram of vascular surgery  consultation.    ABDOMEN - PELVIS  1. No acute intra-abdominal process.  2. Diverticulosis. No evidence of diverticulitis.  3. Large bowel full of stool.                          [1]   Past Surgical History:  Procedure Laterality Date    CT GUIDED PERCUTANEOUS BIOPSY BONE DEEP  4/5/2022    CT GUIDED PERCUTANEOUS BIOPSY BONE DEEP 4/5/2022   [2]   Family History  Problem Relation Name Age of Onset    No Known Problems Mother      No Known Problems Father      Cancer Other maternal side     Diabetes Other paternal side    [3]   Current Outpatient Medications   Medication Sig Dispense Refill    acetaZOLAMIDE (Diamox) 250 mg tablet Take 2 tablets (500 mg) by mouth once daily.      albuterol 90 mcg/actuation inhaler Inhale 2 puffs every 4 hours if needed for wheezing. 18 g 3    allopurinol (Zyloprim) 100 mg tablet Take 1 tablet (100 mg) by mouth 2 times a day. 60 tablet 1    aspirin 81 mg chewable tablet Chew and swallow 1 tablet (81 mg) once daily.      atorvastatin (Lipitor) 20 mg tablet Take 1 tablet (20 mg) by mouth " once daily.      azithromycin (Zithromax) 250 mg tablet Take 1 tablet (250 mg) by mouth once daily.      benzonatate (Tessalon) 100 mg capsule Take 1 capsule (100 mg) by mouth 3 times a day as needed for cough. Do not crush or chew.      buPROPion XL (Wellbutrin XL) 150 mg 24 hr tablet Take 1 tablet (150 mg) by mouth once daily in the morning. Do not crush, chew, or split. 30 tablet 5    calcium carbonate-vitamin D3 500 mg-5 mcg (200 unit) tablet Take 1 tablet by mouth once daily. 90 tablet 3    cyclobenzaprine (Flexeril) 10 mg tablet Take 1 tablet (10 mg) by mouth every 12 hours if needed for muscle spasms. 120 tablet 0    Eliquis 5 mg tablet Take 1 tablet (5 mg) by mouth 2 times a day.      furosemide (Lasix) 20 mg tablet Take 1 tablet (20 mg) by mouth once daily. 90 tablet 1    hydroCHLOROthiazide (HYDRODiuril) 25 mg tablet Take 1 tablet (25 mg) by mouth once daily. 90 tablet 0    leuprolide, 3 month, 22.5 mg injection Inject 22.5 mg under the skin every 3 months.      magnesium oxide (Mag-Ox) 400 mg tablet 1 tablet (400 mg) once daily.      methadone (Dolophine) 5 mg tablet Take 0.5 tablets (2.5 mg) by mouth every 12 hours.      metoprolol succinate XL (Toprol-XL) 25 mg 24 hr tablet Take 1 tablet (25 mg) by mouth once daily.      ondansetron (Zofran) 8 mg tablet Take 1 tablet (8 mg) by mouth every 8 hours if needed.      oxyCODONE (Roxicodone) 5 mg immediate release tablet Take 1 tablet (5 mg) by mouth every 4 hours if needed.      potassium chloride CR 20 mEq ER tablet Take 1 tablet (20 mEq) by mouth once daily. Do not crush or chew. 90 tablet 3    predniSONE (Deltasone) 5 mg tablet Take 1 tablet (5 mg) by mouth once daily.      silver sulfADIAZINE (Silvadene) 1 % cream Apply to affected area twice a day or with each dressing change. 85 g 0    Trelegy Ellipta 100-62.5-25 mcg blister with device INHALE 1 PUFF BY MOUTH DAILY AT SAME TIME.       No current facility-administered medications for this visit.

## 2025-07-25 ENCOUNTER — OFFICE VISIT (OUTPATIENT)
Dept: PRIMARY CARE | Facility: CLINIC | Age: 75
End: 2025-07-25
Payer: MEDICARE

## 2025-07-25 VITALS
WEIGHT: 216.8 LBS | HEIGHT: 70 IN | DIASTOLIC BLOOD PRESSURE: 69 MMHG | OXYGEN SATURATION: 94 % | BODY MASS INDEX: 31.04 KG/M2 | SYSTOLIC BLOOD PRESSURE: 108 MMHG | HEART RATE: 55 BPM

## 2025-07-25 DIAGNOSIS — C79.51 PROSTATE CANCER METASTATIC TO BONE (MULTI): ICD-10-CM

## 2025-07-25 DIAGNOSIS — C61 PROSTATE CANCER METASTATIC TO BONE (MULTI): ICD-10-CM

## 2025-07-25 DIAGNOSIS — R60.0 BILATERAL LEG EDEMA: ICD-10-CM

## 2025-07-25 DIAGNOSIS — N40.1 BENIGN PROSTATIC HYPERPLASIA WITH URINARY HESITANCY: Primary | ICD-10-CM

## 2025-07-25 DIAGNOSIS — R39.11 BENIGN PROSTATIC HYPERPLASIA WITH URINARY HESITANCY: Primary | ICD-10-CM

## 2025-07-25 DIAGNOSIS — F33.0 DEPRESSION, MAJOR, RECURRENT, MILD: ICD-10-CM

## 2025-07-25 DIAGNOSIS — K21.9 GASTROESOPHAGEAL REFLUX DISEASE WITHOUT ESOPHAGITIS: ICD-10-CM

## 2025-07-25 DIAGNOSIS — I10 HYPERTENSION, ESSENTIAL: ICD-10-CM

## 2025-07-25 DIAGNOSIS — J44.9 COPD WITH HYPOXIA (MULTI): ICD-10-CM

## 2025-07-25 DIAGNOSIS — I25.10 CORONARY ARTERY DISEASE INVOLVING NATIVE CORONARY ARTERY OF NATIVE HEART WITHOUT ANGINA PECTORIS: ICD-10-CM

## 2025-07-25 PROCEDURE — 3078F DIAST BP <80 MM HG: CPT | Performed by: FAMILY MEDICINE

## 2025-07-25 PROCEDURE — 3008F BODY MASS INDEX DOCD: CPT | Performed by: FAMILY MEDICINE

## 2025-07-25 PROCEDURE — 1159F MED LIST DOCD IN RCRD: CPT | Performed by: FAMILY MEDICINE

## 2025-07-25 PROCEDURE — 3074F SYST BP LT 130 MM HG: CPT | Performed by: FAMILY MEDICINE

## 2025-07-25 PROCEDURE — 1160F RVW MEDS BY RX/DR IN RCRD: CPT | Performed by: FAMILY MEDICINE

## 2025-07-25 PROCEDURE — 99214 OFFICE O/P EST MOD 30 MIN: CPT | Performed by: FAMILY MEDICINE

## 2025-07-25 ASSESSMENT — ENCOUNTER SYMPTOMS
ARTHRALGIAS: 0
HEMATURIA: 0
EYE DISCHARGE: 0
FATIGUE: 0
DYSPHORIC MOOD: 0
WEAKNESS: 0
VOMITING: 0
HEADACHES: 0
NAUSEA: 0
SORE THROAT: 0
COUGH: 0
NECK PAIN: 0
ACTIVITY CHANGE: 0
ADENOPATHY: 0
NERVOUS/ANXIOUS: 0
BACK PAIN: 0
ABDOMINAL PAIN: 0
CONSTIPATION: 0
DIFFICULTY URINATING: 0
SHORTNESS OF BREATH: 0
BLOOD IN STOOL: 0
DIZZINESS: 0
DIARRHEA: 0
NUMBNESS: 0
CHEST TIGHTNESS: 0
MYALGIAS: 0
BRUISES/BLEEDS EASILY: 0

## 2025-07-25 NOTE — PROGRESS NOTES
"Subjective   Patient ID: Johnnie rAceo is a 75 y.o. male who presents for Leg Swelling.  HPI    Been taking Lasix 20 mg every other day and 40 mg every other day per Casey    Discuss O2 supplement  [facility]    Review of Systems   Constitutional:  Negative for activity change and fatigue.   HENT:  Negative for congestion and sore throat.    Eyes:  Negative for discharge.   Respiratory:  Negative for cough, chest tightness and shortness of breath.    Cardiovascular:  Negative for chest pain and leg swelling.   Gastrointestinal:  Negative for abdominal pain, blood in stool, constipation, diarrhea, nausea and vomiting.   Endocrine: Negative for cold intolerance and heat intolerance.   Genitourinary:  Negative for difficulty urinating and hematuria.   Musculoskeletal:  Negative for arthralgias, back pain, gait problem, myalgias and neck pain.   Allergic/Immunologic: Negative for environmental allergies.   Neurological:  Negative for dizziness, syncope, weakness, numbness and headaches.   Hematological:  Negative for adenopathy. Does not bruise/bleed easily.   Psychiatric/Behavioral:  Negative for dysphoric mood. The patient is not nervous/anxious.    All other systems reviewed and are negative.      Objective   /69 (BP Location: Left arm, BP Cuff Size: Large adult)   Pulse 55   Ht 1.778 m (5' 10\")   Wt 98.3 kg (216 lb 12.8 oz)   SpO2 94%   BMI 31.11 kg/m²    Physical Exam  Vitals and nursing note reviewed.   Constitutional:       General: He is not in acute distress.     Appearance: Normal appearance.   HENT:      Head: Normocephalic and atraumatic.      Right Ear: Tympanic membrane, ear canal and external ear normal.      Left Ear: Tympanic membrane, ear canal and external ear normal.      Nose: Nose normal.      Mouth/Throat:      Mouth: Mucous membranes are moist.      Pharynx: Oropharynx is clear. No oropharyngeal exudate or posterior oropharyngeal erythema.     Eyes:      Extraocular Movements: " Extraocular movements intact.      Conjunctiva/sclera: Conjunctivae normal.      Pupils: Pupils are equal, round, and reactive to light.       Cardiovascular:      Rate and Rhythm: Normal rate and regular rhythm.      Pulses: Normal pulses.      Heart sounds: Normal heart sounds. No murmur heard.  Pulmonary:      Effort: Pulmonary effort is normal. No respiratory distress.      Breath sounds: Normal breath sounds. No wheezing or rales.   Abdominal:      General: Abdomen is flat. Bowel sounds are normal. There is no distension.      Palpations: Abdomen is soft. There is no mass.      Tenderness: There is no abdominal tenderness.     Musculoskeletal:         General: No swelling or deformity. Normal range of motion.      Cervical back: Normal range of motion and neck supple.      Right lower leg: No edema.      Left lower leg: No edema.   Lymphadenopathy:      Cervical: No cervical adenopathy.     Skin:     General: Skin is warm and dry.      Capillary Refill: Capillary refill takes less than 2 seconds.      Findings: No lesion or rash.     Neurological:      General: No focal deficit present.      Mental Status: He is alert and oriented to person, place, and time.      Cranial Nerves: No cranial nerve deficit.      Motor: No weakness.     Psychiatric:         Mood and Affect: Mood normal.         Behavior: Behavior normal.         Thought Content: Thought content normal.         Judgment: Judgment normal.         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  Stay current with age appropriate health maintenance as instructed.  Appointment here or ER with new or worsening symptoms'  Keep appropriate follow-up visit.  Stay current with proper immunizations      or worsening symptoms'  Keep appropriate follow-up visit.  Stay current with proper immunizations   Testing as above  ER if worse  Keep specialist follow-up  Blood work noted  Vascular studies as ordered  3-month recheck

## 2025-07-26 LAB
ANION GAP SERPL CALCULATED.4IONS-SCNC: 11 MMOL/L (CALC) (ref 7–17)
BUN SERPL-MCNC: 19 MG/DL (ref 7–25)
BUN/CREAT SERPL: ABNORMAL (CALC) (ref 6–22)
CALCIUM SERPL-MCNC: 9.3 MG/DL (ref 8.6–10.3)
CHLORIDE SERPL-SCNC: 93 MMOL/L (ref 98–110)
CO2 SERPL-SCNC: 32 MMOL/L (ref 20–32)
CREAT SERPL-MCNC: 0.96 MG/DL (ref 0.7–1.28)
EGFRCR SERPLBLD CKD-EPI 2021: 83 ML/MIN/1.73M2
GLUCOSE SERPL-MCNC: 106 MG/DL (ref 65–139)
POTASSIUM SERPL-SCNC: 3.9 MMOL/L (ref 3.5–5.3)
PSA SERPL-MCNC: 2.46 NG/ML
SODIUM SERPL-SCNC: 136 MMOL/L (ref 135–146)

## 2025-08-08 DIAGNOSIS — I10 HYPERTENSION, ESSENTIAL: ICD-10-CM

## 2025-08-08 RX ORDER — HYDROCHLOROTHIAZIDE 25 MG/1
25 TABLET ORAL DAILY
Qty: 90 TABLET | Refills: 0 | Status: SHIPPED | OUTPATIENT
Start: 2025-08-08

## 2025-08-08 NOTE — TELEPHONE ENCOUNTER
Rx Refill Request     Name: Johnnie Arceo  :  1950   Date of last appointment:  2025   Date of next appointment:  2025   Best number to reach patient:  [phone]

## 2025-08-11 ENCOUNTER — HOSPITAL ENCOUNTER (OUTPATIENT)
Dept: RADIOLOGY | Facility: HOSPITAL | Age: 75
Discharge: HOME | End: 2025-08-11
Payer: MEDICARE

## 2025-08-11 DIAGNOSIS — I87.2 VENOUS INSUFFICIENCY (CHRONIC) (PERIPHERAL): ICD-10-CM

## 2025-08-11 DIAGNOSIS — R60.0 BILATERAL LEG EDEMA: ICD-10-CM

## 2025-08-11 PROCEDURE — 93970 EXTREMITY STUDY: CPT

## 2025-08-11 PROCEDURE — 93970 EXTREMITY STUDY: CPT | Performed by: SURGERY

## 2025-08-19 ENCOUNTER — APPOINTMENT (OUTPATIENT)
Dept: PRIMARY CARE | Facility: CLINIC | Age: 75
End: 2025-08-19
Payer: MEDICARE

## 2025-08-19 VITALS
OXYGEN SATURATION: 95 % | HEIGHT: 70 IN | HEART RATE: 83 BPM | DIASTOLIC BLOOD PRESSURE: 61 MMHG | SYSTOLIC BLOOD PRESSURE: 103 MMHG | WEIGHT: 217.4 LBS | BODY MASS INDEX: 31.12 KG/M2

## 2025-08-19 DIAGNOSIS — E78.2 HYPERLIPIDEMIA, MIXED: ICD-10-CM

## 2025-08-19 DIAGNOSIS — M1A.09X0 IDIOPATHIC CHRONIC GOUT OF MULTIPLE SITES WITHOUT TOPHUS: ICD-10-CM

## 2025-08-19 DIAGNOSIS — C61 PROSTATE CANCER METASTATIC TO BONE (MULTI): ICD-10-CM

## 2025-08-19 DIAGNOSIS — R39.11 BENIGN PROSTATIC HYPERPLASIA WITH URINARY HESITANCY: ICD-10-CM

## 2025-08-19 DIAGNOSIS — K21.9 GASTROESOPHAGEAL REFLUX DISEASE WITHOUT ESOPHAGITIS: ICD-10-CM

## 2025-08-19 DIAGNOSIS — I10 HYPERTENSION, ESSENTIAL: ICD-10-CM

## 2025-08-19 DIAGNOSIS — F33.0 DEPRESSION, MAJOR, RECURRENT, MILD: ICD-10-CM

## 2025-08-19 DIAGNOSIS — N40.1 BENIGN PROSTATIC HYPERPLASIA WITH URINARY HESITANCY: ICD-10-CM

## 2025-08-19 DIAGNOSIS — J43.1 PANLOBULAR EMPHYSEMA (MULTI): ICD-10-CM

## 2025-08-19 DIAGNOSIS — R73.02 GLUCOSE INTOLERANCE (IMPAIRED GLUCOSE TOLERANCE): ICD-10-CM

## 2025-08-19 DIAGNOSIS — C79.51 PROSTATE CANCER METASTATIC TO BONE (MULTI): ICD-10-CM

## 2025-08-19 DIAGNOSIS — I25.10 CORONARY ARTERY DISEASE INVOLVING NATIVE CORONARY ARTERY OF NATIVE HEART WITHOUT ANGINA PECTORIS: Primary | ICD-10-CM

## 2025-08-19 PROCEDURE — 3074F SYST BP LT 130 MM HG: CPT | Performed by: FAMILY MEDICINE

## 2025-08-19 PROCEDURE — 99214 OFFICE O/P EST MOD 30 MIN: CPT | Performed by: FAMILY MEDICINE

## 2025-08-19 PROCEDURE — 1159F MED LIST DOCD IN RCRD: CPT | Performed by: FAMILY MEDICINE

## 2025-08-19 PROCEDURE — 3078F DIAST BP <80 MM HG: CPT | Performed by: FAMILY MEDICINE

## 2025-08-19 PROCEDURE — 3008F BODY MASS INDEX DOCD: CPT | Performed by: FAMILY MEDICINE

## 2025-08-19 PROCEDURE — 1160F RVW MEDS BY RX/DR IN RCRD: CPT | Performed by: FAMILY MEDICINE

## 2025-08-19 RX ORDER — FUROSEMIDE 20 MG/1
20 TABLET ORAL DAILY
Qty: 90 TABLET | Refills: 1 | Status: SHIPPED | OUTPATIENT
Start: 2025-08-19 | End: 2026-08-19

## 2025-08-19 RX ORDER — FUROSEMIDE 40 MG/1
40 TABLET ORAL DAILY
Qty: 30 TABLET | Refills: 11 | Status: SHIPPED | OUTPATIENT
Start: 2025-08-19 | End: 2026-08-19

## 2025-08-19 ASSESSMENT — ENCOUNTER SYMPTOMS
NERVOUS/ANXIOUS: 0
WEAKNESS: 0
VOMITING: 0
SORE THROAT: 0
ABDOMINAL PAIN: 0
HEADACHES: 0
NUMBNESS: 0
HEMATURIA: 0
BLOOD IN STOOL: 0
NAUSEA: 0
ARTHRALGIAS: 0
CHEST TIGHTNESS: 0
EYE DISCHARGE: 0
BACK PAIN: 0
DYSPHORIC MOOD: 0
DIZZINESS: 0
NECK PAIN: 0
SHORTNESS OF BREATH: 0
DIARRHEA: 0
DIFFICULTY URINATING: 0
FATIGUE: 0
COUGH: 0
ACTIVITY CHANGE: 0
MYALGIAS: 0
BRUISES/BLEEDS EASILY: 0
ADENOPATHY: 0
CONSTIPATION: 0

## 2025-08-25 ENCOUNTER — APPOINTMENT (OUTPATIENT)
Dept: PRIMARY CARE | Facility: CLINIC | Age: 75
End: 2025-08-25
Payer: MEDICARE

## 2025-08-27 DIAGNOSIS — F33.0 DEPRESSION, MAJOR, RECURRENT, MILD: ICD-10-CM

## 2025-08-28 RX ORDER — BUPROPION HYDROCHLORIDE 150 MG/1
150 TABLET ORAL
Qty: 90 TABLET | Refills: 0 | Status: SHIPPED | OUTPATIENT
Start: 2025-08-28

## 2025-09-04 ENCOUNTER — PATIENT MESSAGE (OUTPATIENT)
Dept: PRIMARY CARE | Facility: CLINIC | Age: 75
End: 2025-09-04
Payer: MEDICARE

## 2025-09-04 DIAGNOSIS — R05.1 ACUTE COUGH: ICD-10-CM

## 2025-09-04 DIAGNOSIS — E83.42 HYPOMAGNESEMIA: Primary | ICD-10-CM

## 2025-09-04 RX ORDER — CALCIUM CARBONATE 300MG(750)
400 TABLET,CHEWABLE ORAL DAILY
Qty: 90 TABLET | Refills: 1 | Status: SHIPPED | OUTPATIENT
Start: 2025-09-04 | End: 2026-03-03

## 2025-09-04 RX ORDER — BENZONATATE 200 MG/1
200 CAPSULE ORAL 3 TIMES DAILY PRN
Qty: 90 CAPSULE | Refills: 1 | Status: SHIPPED | OUTPATIENT
Start: 2025-09-04 | End: 2025-11-03

## 2025-10-03 ENCOUNTER — APPOINTMENT (OUTPATIENT)
Dept: PRIMARY CARE | Facility: CLINIC | Age: 75
End: 2025-10-03
Payer: MEDICARE

## 2025-10-10 ENCOUNTER — APPOINTMENT (OUTPATIENT)
Dept: PRIMARY CARE | Facility: CLINIC | Age: 75
End: 2025-10-10
Payer: MEDICARE

## 2026-07-13 ENCOUNTER — APPOINTMENT (OUTPATIENT)
Dept: PRIMARY CARE | Facility: CLINIC | Age: 76
End: 2026-07-13
Payer: MEDICARE